# Patient Record
Sex: FEMALE | Race: WHITE | NOT HISPANIC OR LATINO | Employment: FULL TIME | URBAN - METROPOLITAN AREA
[De-identification: names, ages, dates, MRNs, and addresses within clinical notes are randomized per-mention and may not be internally consistent; named-entity substitution may affect disease eponyms.]

---

## 2017-09-09 ENCOUNTER — OFFICE VISIT (OUTPATIENT)
Dept: URGENT CARE | Facility: URGENT CARE | Age: 32
End: 2017-09-09
Payer: COMMERCIAL

## 2017-09-09 VITALS
OXYGEN SATURATION: 98 % | HEART RATE: 84 BPM | WEIGHT: 207 LBS | TEMPERATURE: 98.4 F | DIASTOLIC BLOOD PRESSURE: 84 MMHG | SYSTOLIC BLOOD PRESSURE: 111 MMHG

## 2017-09-09 DIAGNOSIS — M12.9 ARTHROPATHY OF MULTIPLE SITES: Primary | ICD-10-CM

## 2017-09-09 LAB
ANION GAP SERPL CALCULATED.3IONS-SCNC: 8 MMOL/L (ref 3–14)
BASOPHILS # BLD AUTO: 0 10E9/L (ref 0–0.2)
BASOPHILS NFR BLD AUTO: 0.2 %
BUN SERPL-MCNC: 9 MG/DL (ref 7–30)
CALCIUM SERPL-MCNC: 9.2 MG/DL (ref 8.5–10.1)
CHLORIDE SERPL-SCNC: 106 MMOL/L (ref 94–109)
CO2 SERPL-SCNC: 26 MMOL/L (ref 20–32)
CREAT SERPL-MCNC: 0.59 MG/DL (ref 0.52–1.04)
DIFFERENTIAL METHOD BLD: ABNORMAL
EOSINOPHIL # BLD AUTO: 0.1 10E9/L (ref 0–0.7)
EOSINOPHIL NFR BLD AUTO: 1 %
ERYTHROCYTE [DISTWIDTH] IN BLOOD BY AUTOMATED COUNT: 13.4 % (ref 10–15)
GFR SERPL CREATININE-BSD FRML MDRD: >90 ML/MIN/1.7M2
GLUCOSE SERPL-MCNC: 87 MG/DL (ref 70–99)
HCT VFR BLD AUTO: 46.9 % (ref 35–47)
HGB BLD-MCNC: 15.5 G/DL (ref 11.7–15.7)
LYMPHOCYTES # BLD AUTO: 2.3 10E9/L (ref 0.8–5.3)
LYMPHOCYTES NFR BLD AUTO: 22.4 %
MCH RBC QN AUTO: 29.1 PG (ref 26.5–33)
MCHC RBC AUTO-ENTMCNC: 33 G/DL (ref 31.5–36.5)
MCV RBC AUTO: 88 FL (ref 78–100)
MONOCYTES # BLD AUTO: 0.6 10E9/L (ref 0–1.3)
MONOCYTES NFR BLD AUTO: 5.7 %
NEUTROPHILS # BLD AUTO: 7.3 10E9/L (ref 1.6–8.3)
NEUTROPHILS NFR BLD AUTO: 70.7 %
PLATELET # BLD AUTO: 233 10E9/L (ref 150–450)
POTASSIUM SERPL-SCNC: 3.9 MMOL/L (ref 3.4–5.3)
RBC # BLD AUTO: 5.33 10E12/L (ref 3.8–5.2)
SODIUM SERPL-SCNC: 140 MMOL/L (ref 133–144)
WBC # BLD AUTO: 10.3 10E9/L (ref 4–11)

## 2017-09-09 PROCEDURE — 87340 HEPATITIS B SURFACE AG IA: CPT | Performed by: INTERNAL MEDICINE

## 2017-09-09 PROCEDURE — 87389 HIV-1 AG W/HIV-1&-2 AB AG IA: CPT | Performed by: INTERNAL MEDICINE

## 2017-09-09 PROCEDURE — 86803 HEPATITIS C AB TEST: CPT | Performed by: INTERNAL MEDICINE

## 2017-09-09 PROCEDURE — 36415 COLL VENOUS BLD VENIPUNCTURE: CPT | Performed by: INTERNAL MEDICINE

## 2017-09-09 PROCEDURE — 87040 BLOOD CULTURE FOR BACTERIA: CPT | Performed by: INTERNAL MEDICINE

## 2017-09-09 PROCEDURE — 86431 RHEUMATOID FACTOR QUANT: CPT | Performed by: INTERNAL MEDICINE

## 2017-09-09 PROCEDURE — 86618 LYME DISEASE ANTIBODY: CPT | Performed by: INTERNAL MEDICINE

## 2017-09-09 PROCEDURE — 99204 OFFICE O/P NEW MOD 45 MIN: CPT | Performed by: INTERNAL MEDICINE

## 2017-09-09 PROCEDURE — 86140 C-REACTIVE PROTEIN: CPT | Performed by: INTERNAL MEDICINE

## 2017-09-09 PROCEDURE — 80048 BASIC METABOLIC PNL TOTAL CA: CPT | Performed by: INTERNAL MEDICINE

## 2017-09-09 PROCEDURE — 85025 COMPLETE CBC W/AUTO DIFF WBC: CPT | Performed by: INTERNAL MEDICINE

## 2017-09-09 PROCEDURE — 86038 ANTINUCLEAR ANTIBODIES: CPT | Performed by: INTERNAL MEDICINE

## 2017-09-09 RX ORDER — ACETAMINOPHEN 500 MG
1000 TABLET ORAL EVERY 6 HOURS PRN
COMMUNITY
End: 2023-08-24

## 2017-09-09 RX ORDER — DICLOFENAC SODIUM 75 MG/1
75 TABLET, DELAYED RELEASE ORAL 2 TIMES DAILY
Qty: 60 TABLET | Refills: 1 | Status: SHIPPED | OUTPATIENT
Start: 2017-09-09 | End: 2018-02-13

## 2017-09-09 RX ORDER — CETIRIZINE HYDROCHLORIDE 10 MG/1
10 TABLET ORAL DAILY
COMMUNITY
End: 2023-08-24

## 2017-09-09 NOTE — PROGRESS NOTES
"SUBJECTIVE:  Tiffanie Tate, a 32 year old female, presents for evaluation of joint pains.  This started about two weeks ago.  Located in elbows, shoulders, knees, hips.  Associated swelling in the hands and feet.  Pain keeping her awake at night. In the AM, she feels \"really stiff.\"  Will loosen up after about 30 minutes. She had a rash on the gluteal region over the past several weeks. Denies eye irritation or vision changes. No oral ulcers. No pleurisy, chest pain or dyspnea. No sore throat. Appetite is normal.  Bowel pattern basically normal, no abdominal pain. Denies dysuria, hematuria, pyuria, frothy urine. Weight has gone down 5-10 pounds over the past several weeks (she states this is intentional with low carb diet).    FAMH: father gets \"severe joint pain\" but not officially diagnosed.    SOCH: she did go camping in July in [place], no known tick bites; sexually active     PMH: she has seasonal allergies; intermittent asthma; HPV diagnosis in 2007    PSH: PE tubes, sinus surgery in childhood    ROS:  The following systems have been completely reviewed and are negative except as noted in the HPI: CONSTITUTIONAL, EYE, HEAD AND NECK, CARDIOVASCULAR, PULMONARY, GASTROINTESTINAL, RENAL, HEMATOLOGIC, DERMATOLOGIC, MUSCULOSKELETAL and NEUROLOGIC    OBJECTIVE:  /84  Pulse 84  Temp 98.4  F (36.9  C) (Oral)  Wt 207 lb (93.9 kg)  SpO2 98%  GENERAL: healthy, alert and no distress  EYES: PERRLA, EOMI, conjunctivae and sclerae clear, fundi are benign with sharp optic discs  HENT: no oral ulcerations or erythema  NECK: no adenopathy, no asymmetry, masses, or scars and thyroid normal to palpation  RESP: clear to auscultation and percussion bilaterally; normal I:E ratio  CV: regular rates and rhythm, normal S1 S2, no S3 or S4 and no murmur, click or rub -  ABDOMEN: soft, nontender, without hepatosplenomegaly or masses and bowel sounds normal  EXT: no cyanosis, clubbing or edema; peripheral pulses are " brisk and symmetric in the radials, dorsalis pedis and posterior tibials bilaterally  M/SKEL: there are diffuse effusions of the MCP joints on the left hand; no clear joint effusions of other joints but pain with PROM is noted in the hands, elbows, wrists as well as tenderness to palpation of the knees, ankles and toes; no joint erythema  SKIN: erythematous papules are scattered over the buttocks (right > left); some of these have central black necrotic eschar; no purulent discharge  NEURO: Normal strength and tone, sensory exam grossly normal, mentation intact and speech normal     LAB:   Recent Labs   Lab Test  09/09/17   1118   WBC  10.3   RBC  5.33*   HGB  15.5   HCT  46.9   MCV  88   MCH  29.1   MCHC  33.0   RDW  13.4   PLT  233     Recent Labs   Lab Test  09/09/17   1118   NA  140   POTASSIUM  3.9   CHLORIDE  106   CO2  26   BUN  9   CR  0.59   GLC  87     CRP pending  Blood culture pending  HIV, HBV, HCV pending  Lyme Ab pending  Anti Nuclear Ab and RF pending    ASSESSMENT/PLAN:    ICD-10-CM    1. Arthropathy of multiple sites M12.9 CBC with platelets differential     Lyme Disease Yuliet with reflex to WB Serum     Anti Nuclear Yuliet IgG by IFA with Reflex     Rheumatoid factor     Hepatitis B surface antigen     Hepatitis C antibody     HIV Antigen Antibody Combo     Basic metabolic panel  (Ca, Cl, CO2, Creat, Gluc, K, Na, BUN)     UA without Microscopic     NEISSERIA GONORRHOEA PCR     CHLAMYDIA TRACHOMATIS PCR     Blood culture     CRP, inflammation     diclofenac (VOLTAREN) 75 MG EC tablet    Young female, otherwise healthy, presents with acute inflammatory polyarthritis with associated cutaneous lesions that appear to have central eschar; no other organ systems appear to be involved.  She has some constitutional symptoms including fatigue and subjective temperature instability.  Extensive differential diagnosis considered including reactive arthritis, Lyme disease, subacute bacterial endocarditis, rheumatoid  arthritis, arthropathy associated with HBV, HCV or HIV, lupus, sarcoidosis.  While reactive arthritis would be the most likely, the skin lesions do concern me for a possible endovascular infection such as SBE.  Numerous studies are pending aiming at underlying infectious disease or rheumatologic disease.  Start diclofenac for symptom relief and f/u in this coming week to establish primary care.  I have advised establishing at [facility] to review lab results and determine need for further w/u or treatment modification.         Nick Fong MD

## 2017-09-09 NOTE — NURSING NOTE
Chief Complaint   Patient presents with     Joint Pain     joint pain over various parts of body since last month.      Swelling     swelling of right hand and feet off/on for one week.       Initial /84  Pulse 84  Temp 98.4  F (36.9  C) (Oral)  Wt 207 lb (93.9 kg)  SpO2 98% There is no height or weight on file to calculate BMI.  Medication Reconciliation: complete

## 2017-09-09 NOTE — MR AVS SNAPSHOT
After Visit Summary   9/9/2017    Tiffanie Tate    MRN: [id number]           Patient Information     Date Of Birth          1985        Visit Information        Provider Department      9/9/2017 9:45 AM Nick Fong MD [facility]        Today's Diagnoses     Arthropathy of multiple sites    -  1      Care Instructions    At this point, my tentative diagnosis is a reactive arthritis.  We will treat this with diclofenac twice daily as an anti-inflammatory.  You can continue to take acetaminophen (up to 3,000 mg per day -- 1,000 mg three times daily as needed).      A number of tests are pending to look for possible underlying conditions that could have triggered this arthritis.      Make an appointment for follow-up at [facility] in the coming week.  Any of these MDs would be good to follow this up for you:    Dr. Branden Sweet Dr. Serum          Follow-ups after your visit        Your next 10 appointments already scheduled     Oct 04, 2017  2:30 PM CDT   [facility] Ramila Pak,    [facility]    [address] [place] [id number] [phone]           Bring a current list of meds and any records pertaining to this visit. For Physicals, please bring immunization records and any forms needing to be filled out. Please arrive 10 minutes early to complete paperwork.              Who to contact     If you have questions or need follow up information about today's clinic visit or your schedule please contact [facility] directly at [phone].  Normal or non-critical lab and imaging results will be communicated to you by MyChart, letter or phone within 4 business days after the clinic has received the results. If you do not hear from us within 7 days, please contact the clinic through MyChart or phone. If you  "have a critical or abnormal lab result, we will notify you by phone as soon as possible.  Submit refill requests through NuAx or call your pharmacy and they will forward the refill request to us. Please allow 3 business days for your refill to be completed.          Additional Information About Your Visit        AppTankhart Information     NuAx lets you send messages to your doctor, view your test results, renew your prescriptions, schedule appointments and more. To sign up, go to [email or web address] . Click on \"Log in\" on the left side of the screen, which will take you to the Welcome page. Then click on \"Sign up Now\" on the right side of the page.     You will be asked to enter the access code listed below, as well as some personal information. Please follow the directions to create your username and password.     Your access code is: [email or web address]  Expires: 2017 12:36 PM     Your access code will  in 90 days. If you need help or a new code, please call your [place] clinic or [phone].        Care EveryWhere ID     This is your Care EveryWhere ID. This could be used by other organizations to access your [place] medical records  [id number]        Your Vitals Were     Pulse Temperature Pulse Oximetry             84 98.4  F (36.9  C) (Oral) 98%          Blood Pressure from Last 3 Encounters:   17 111/84    Weight from Last 3 Encounters:   17 207 lb (93.9 kg)              We Performed the Following     Anti Nuclear Yuliet IgG by IFA with Reflex     Basic metabolic panel  (Ca, Cl, CO2, Creat, Gluc, K, Na, BUN)     Blood culture     CBC with platelets differential     CHLAMYDIA TRACHOMATIS PCR     CRP, inflammation     Hepatitis B surface antigen     Hepatitis C antibody     HIV Antigen Antibody Combo     Lyme Disease Yuliet with reflex to WB Serum     NEISSERIA GONORRHOEA PCR     Rheumatoid factor     UA without Microscopic          Today's Medication Changes          These changes are " accurate as of: 9/9/17 12:36 PM.  If you have any questions, ask your nurse or doctor.               Start taking these medicines.        Dose/Directions    diclofenac 75 MG EC tablet   Commonly known as:  VOLTAREN   Used for:  Arthropathy of multiple sites   Started by:  Nick Fong MD        Dose:  75 mg   Take 1 tablet (75 mg) by mouth 2 times daily   Quantity:  60 tablet   Refills:  1            Where to get your medicines      These medications were sent to [facility] [id number] [place] [address] - [address] AT [facility] [address]  [address], [facility] [address]    Hours:  24-hours Phone:  [phone]     diclofenac 75 MG EC tablet                Primary Care Provider    None Specified       No primary provider on file.        Equal Access to Services     MARCELL RIVERA AH: Hadparis crawfordo Sobriana, waaxda luqadaha, qaybta kaalmada adeegyada, grant west . So [facility] [phone].    ATENCIÓN: Si habla español, tiene a iyer disposición servicios gratuitos de asistencia lingüística. Llame al [phone].    We comply with applicable federal civil rights laws and [place] laws. We do not discriminate on the basis of race, color, national origin, age, disability sex, sexual orientation or gender identity.            Thank you!     Thank you for choosing [facility]  for your care. Our goal is always to provide you with excellent care. Hearing back from our patients is one way we can continue to improve our services. Please take a few minutes to complete the written survey that you may receive in the mail after your visit with us. Thank you!             Your Updated Medication List - Protect others around you: Learn how to safely use, store and throw away your medicines at www.disposemymeds.org.          This list is accurate as of: 9/9/17 12:36 PM.  Always use your most recent med list.                   Brand Name Dispense  Instructions for use Diagnosis    B-12 PO      Take by mouth daily        cetirizine 10 MG tablet    zyrTEC     Take 10 mg by mouth daily        diclofenac 75 MG EC tablet    VOLTAREN    60 tablet    Take 1 tablet (75 mg) by mouth 2 times daily    Arthropathy of multiple sites       MAGNESIUM CITRATE PO      Take 1 tablet by mouth daily        NEXPLANON 68 MG Impl   Generic drug:  etonogestrel      1 each by Subdermal route once        TYLENOL 500 MG tablet   Generic drug:  acetaminophen      Take 1,000 mg by mouth every 6 hours as needed for mild pain

## 2017-09-09 NOTE — PATIENT INSTRUCTIONS
At this point, my tentative diagnosis is a reactive arthritis.  We will treat this with diclofenac twice daily as an anti-inflammatory.  You can continue to take acetaminophen (up to 3,000 mg per day -- 1,000 mg three times daily as needed).      A number of tests are pending to look for possible underlying conditions that could have triggered this arthritis.      Make an appointment for follow-up at [facility] in the coming week.  Any of these MDs would be good to follow this up for you:    Dr. Branden Sweet Dr. Serum

## 2017-09-10 LAB — CRP SERPL-MCNC: 16 MG/L (ref 0–8)

## 2017-09-11 LAB
B BURGDOR IGG+IGM SER QL: 0.06 (ref 0–0.89)
HBV SURFACE AG SERPL QL IA: NONREACTIVE
HCV AB SERPL QL IA: NONREACTIVE
HIV 1+2 AB+HIV1 P24 AG SERPL QL IA: NONREACTIVE

## 2017-09-12 LAB — RHEUMATOID FACT SER NEPH-ACNC: <20 IU/ML (ref 0–20)

## 2017-09-13 LAB — ANA SER QL IF: NEGATIVE

## 2017-09-15 LAB
BACTERIA SPEC CULT: NO GROWTH
Lab: NORMAL
SPECIMEN SOURCE: NORMAL

## 2017-09-19 ENCOUNTER — OFFICE VISIT (OUTPATIENT)
Dept: PEDIATRICS | Facility: CLINIC | Age: 32
End: 2017-09-19
Payer: COMMERCIAL

## 2017-09-19 VITALS
BODY MASS INDEX: 33.65 KG/M2 | TEMPERATURE: 98.4 F | HEIGHT: 66 IN | WEIGHT: 209.4 LBS | DIASTOLIC BLOOD PRESSURE: 60 MMHG | HEART RATE: 79 BPM | OXYGEN SATURATION: 98 % | SYSTOLIC BLOOD PRESSURE: 100 MMHG

## 2017-09-19 DIAGNOSIS — D22.9 ATYPICAL NEVI: ICD-10-CM

## 2017-09-19 DIAGNOSIS — M25.562 ARTHRALGIA OF BOTH KNEES: Primary | ICD-10-CM

## 2017-09-19 DIAGNOSIS — R21 RASH: ICD-10-CM

## 2017-09-19 DIAGNOSIS — M25.561 ARTHRALGIA OF BOTH KNEES: Primary | ICD-10-CM

## 2017-09-19 LAB
CRP SERPL-MCNC: 15 MG/L (ref 0–8)
ERYTHROCYTE [SEDIMENTATION RATE] IN BLOOD BY WESTERGREN METHOD: 10 MM/H (ref 0–20)

## 2017-09-19 PROCEDURE — 87491 CHLMYD TRACH DNA AMP PROBE: CPT | Performed by: INTERNAL MEDICINE

## 2017-09-19 PROCEDURE — 86747 PARVOVIRUS ANTIBODY: CPT | Mod: 90 | Performed by: INTERNAL MEDICINE

## 2017-09-19 PROCEDURE — 99000 SPECIMEN HANDLING OFFICE-LAB: CPT | Performed by: INTERNAL MEDICINE

## 2017-09-19 PROCEDURE — 85652 RBC SED RATE AUTOMATED: CPT | Performed by: INTERNAL MEDICINE

## 2017-09-19 PROCEDURE — 36415 COLL VENOUS BLD VENIPUNCTURE: CPT | Performed by: INTERNAL MEDICINE

## 2017-09-19 PROCEDURE — 99214 OFFICE O/P EST MOD 30 MIN: CPT | Performed by: INTERNAL MEDICINE

## 2017-09-19 PROCEDURE — 86140 C-REACTIVE PROTEIN: CPT | Performed by: INTERNAL MEDICINE

## 2017-09-19 PROCEDURE — 87591 N.GONORRHOEAE DNA AMP PROB: CPT | Performed by: INTERNAL MEDICINE

## 2017-09-19 RX ORDER — MUPIROCIN 20 MG/G
OINTMENT TOPICAL 3 TIMES DAILY
Qty: 22 G | Refills: 1 | Status: SHIPPED | OUTPATIENT
Start: 2017-09-19 | End: 2017-09-24

## 2017-09-19 NOTE — MR AVS SNAPSHOT
After Visit Summary   9/19/2017    Tiffanie Tate    MRN: [id number]           Patient Information     Date Of Birth          1985        Visit Information        Provider Department      9/19/2017 9:50 AM Ruba Perez MD [facility]        Today's Diagnoses     Arthralgia of both knees    -  1    Rash          Care Instructions    I'm not sure what this is, but so far your labs are relatively reassuring. Your Lyme and rheumatology labs have been normal, just a mildly elevated inflammatory marker.    We will check for infections that can cause these symptoms (parvovirus, gonorrhea). We will also repeat the CRP and check an ESR (inflammatory markers).    This could be a skin infection, so I would like for you to try using mupirocin ointment on the rash areas 2-3 times daily for next 5-7 days.     Continue voltaren for now, take with food!    Follow-up with Rheumatology if your symptoms are not improving.           Follow-ups after your visit        Additional Services     RHEUMATOLOGY REFERRAL       Your provider has referred you to: [facility]:  [facility] [place]   [phone] [email or web address]    Please be aware that coverage of these services is subject to the terms and limitations of your health insurance plan.  Call member services at your health plan with any benefit or coverage questions.      Please bring the following with you to your appointment:    (1) Any X-Rays, CTs or MRIs which have been performed.  Contact the facility where they were done to arrange for  prior to your scheduled appointment.    (2) List of current medications   (3) This referral request   (4) Any documents/labs given to you for this referral                  Your next 10 appointments already scheduled     Oct 04, 2017  2:30 PM CDT   [facility]    [address]  [facility] [address]   [phone]           Bring a current list of meds and any records pertaining to this visit. For Physicals, please bring immunization records and any forms needing to be filled out. Please arrive 10 minutes early to complete paperwork.              Who to contact     If you have questions or need follow up information about today's clinic visit or your schedule please contact [facility] directly at [phone].  Normal or non-critical lab and imaging results will be communicated to you by Greenlinghart, letter or phone within 4 business days after the clinic has received the results. If you do not hear from us within 7 days, please contact the clinic through Greenlinghart or phone. If you have a critical or abnormal lab result, we will notify you by phone as soon as possible.  Submit refill requests through Skipola or call your pharmacy and they will forward the refill request to us. Please allow 3 business days for your refill to be completed.          Additional Information About Your Visit        Skipola Information     Skipola lets you send messages to your doctor, view your test results, renew your prescriptions, schedule appointments and more. To sign up, go to [email or web address] . Click on \"Log in\" on the left side of the screen, which will take you to the Welcome page. Then click on \"Sign up Now\" on the right side of the page.     You will be asked to enter the access code listed below, as well as some personal information. Please follow the directions to create your username and password.     Your access code is: [email or web address]  Expires: 2017 12:36 PM     Your access code will  in 90 days. If you need help or a new code, please call your [place] clinic or [phone].        Care EveryWhere ID     This is your Care EveryWhere ID. This could be used by other organizations to access your [place] medical records  [id number]        Your Vitals Were     " "Pulse Temperature Height Pulse Oximetry BMI (Body Mass Index)       79 98.4  F (36.9  C) (Oral) 5' 5.55\" (1.665 m) 98% 34.26 kg/m2        Blood Pressure from Last 3 Encounters:   09/19/17 100/60   09/09/17 111/84    Weight from Last 3 Encounters:   09/19/17 209 lb 6.4 oz (95 kg)   09/09/17 207 lb (93.9 kg)              We Performed the Following     Chlamydia trachomatis PCR     CRP, inflammation     Erythrocyte sedimentation rate auto     Neisseria gonorrhoeae PCR     Parvovirus B19 antibodies IgG IgM     RHEUMATOLOGY REFERRAL          Today's Medication Changes          These changes are accurate as of: 9/19/17 10:16 AM.  If you have any questions, ask your nurse or doctor.               Start taking these medicines.        Dose/Directions    mupirocin 2 % ointment   Commonly known as:  BACTROBAN   Used for:  Rash   Started by:  Ruba Perez MD        Apply topically 3 times daily for 5 days   Quantity:  22 g   Refills:  1            Where to get your medicines      These medications were sent to [facility] [address] [place] [address] [address] AT [facility] [address]  [address], [facility] [address]    Hours:  24-hours Phone:  [phone]     mupirocin 2 % ointment                Primary Care Provider    None Specified       No primary provider on file.        Equal Access to Services     MARCELL RIVERA AH: Hadii alf ku hadasho Soomaali, waaxda luqadaha, qaybta kaalmada adeegyada, grant torres. So [facility] [place] [facility] [phone].    ATENCIÓN: Si habla español, tiene a iyer disposición servicios gratuitos de asistencia lingüística. Javier al [phone].    We comply with applicable federal civil rights laws and [place] laws. We do not discriminate on the basis of race, color, national origin, age, disability sex, sexual orientation or gender identity.            Thank you!     Thank you for choosing [facility]  for your care. Our goal is always to provide " you with excellent care. Hearing back from our patients is one way we can continue to improve our services. Please take a few minutes to complete the written survey that you may receive in the mail after your visit with us. Thank you!             Your Updated Medication List - Protect others around you: Learn how to safely use, store and throw away your medicines at www.disposemymeds.org.          This list is accurate as of: 9/19/17 10:16 AM.  Always use your most recent med list.                   Brand Name Dispense Instructions for use Diagnosis    B-12 PO      Take by mouth daily        cetirizine 10 MG tablet    zyrTEC     Take 10 mg by mouth daily        diclofenac 75 MG EC tablet    VOLTAREN    60 tablet    Take 1 tablet (75 mg) by mouth 2 times daily    Arthropathy of multiple sites       MAGNESIUM CITRATE PO      Take 1 tablet by mouth daily        mupirocin 2 % ointment    BACTROBAN    22 g    Apply topically 3 times daily for 5 days    Rash       NEXPLANON 68 MG Impl   Generic drug:  etonogestrel      1 each by Subdermal route once        TYLENOL 500 MG tablet   Generic drug:  acetaminophen      Take 1,000 mg by mouth every 6 hours as needed for mild pain

## 2017-09-19 NOTE — PATIENT INSTRUCTIONS
I'm not sure what this is, but so far your labs are relatively reassuring. Your Lyme and rheumatology labs have been normal, just a mildly elevated inflammatory marker.    We will check for infections that can cause these symptoms (parvovirus, gonorrhea). We will also repeat the CRP and check an ESR (inflammatory markers).    This could be a skin infection, so I would like for you to try using mupirocin ointment on the rash areas 2-3 times daily for next 5-7 days.     Continue voltaren for now, take with food!    Follow-up with Rheumatology if your symptoms are not improving.

## 2017-09-19 NOTE — PROGRESS NOTES
"  SUBJECTIVE:   Tiffanie Tate is a 32 year old female who presents to clinic today for the following health issues:      ED/UC Followup:    Facility:  [facility] in [place]  Date of visit: 9/9/17  Reason for visit: joint pain and swelling  Current Status: some pain in both knees.       Tiffanie comes in for follow-up of her rash and joint pain. She reports that the rash has improved although she is still getting some new spots on her buttocks. Rash in her groin is improving. Does still have some bilateral knee pain and swelling. R hand swelling is much better, but L hand feels more swollen. No fevers or chills currently, but did have fevers prior to this presentation. Does report some elbow pain as well. No weakness, cough, blood in urine or sputum. No abdominal pain. No vaginal discharge. She is sexually active.    Problem list and histories reviewed & adjusted, as indicated.  Additional history: as documented    There is no problem list on file for this patient.    History reviewed. No pertinent surgical history.    Social History   Substance Use Topics     Smoking status: Never Smoker     Smokeless tobacco: Never Used     Alcohol use Not on file     History reviewed. No pertinent family history.          Reviewed and updated as needed this visit by clinical staff  Tobacco  Allergies  Med Hx  Surg Hx  Fam Hx  Soc Hx        ROS:  Constitutional, derm, rheum, pulmonary, gi and gu systems are negative, except as otherwise noted.      OBJECTIVE:   /60 (BP Location: Right arm, Patient Position: Chair, Cuff Size: Adult Large)  Pulse 79  Temp 98.4  F (36.9  C) (Oral)  Ht 5' 5.55\" (1.665 m)  Wt 209 lb 6.4 oz (95 kg)  SpO2 98%  BMI 34.26 kg/m2  Body mass index is 34.26 kg/(m^2).  GENERAL: healthy, alert and no distress  EYES: Eyes grossly normal to inspection, PERRL and conjunctivae and sclerae normal  HENT: nose and mouth without ulcers or lesions, oropharynx clear and oral mucous membranes " moist  NECK: no adenopathy, no asymmetry, masses, or scars and thyroid normal to palpation  RESP: lungs clear to auscultation - no rales, rhonchi or wheezes  CV: regular rate and rhythm, normal S1 S2, no S3 or S4, no murmur, click or rub, no peripheral edema and peripheral pulses strong  ABDOMEN: soft, nontender, no hepatosplenomegaly, no masses and bowel sounds normal  MS: perhaps minimal joint effusions in knees bilaterally, otherwise no other joint effusions appreciated.   SKIN: scattered erythematous papules over buttocks bilaterally, in various stages of healing. L inguinal crease with what appears to be healing denuded bulla.     Diagnostic Test Results:  none     ASSESSMENT/PLAN:     1. Arthralgia of both knees  In setting of rash, knee and wrist pain, fevers, and elevated CRP recently, certainly broad differential. Recent blood culture, lyme, and hepatitis testing negative. Would also consider inflammatory process, acute viral illness such as Parvovirus, possible serum sickness (however, unclear trigger), gonorrheal infection, or other inflammatory arthritis (less likely with negative NIKI). Bacterial infection possible, although blood culture negative. Will treat denuded rash area with mupirocin for now. Will obtain labs as below. Patient's symptoms appear to be gradually improving, if labs concerning or worsening, or if symptoms worsen will follow-up with Rheumatology for further work-up.   - RHEUMATOLOGY REFERRAL  - CRP, inflammation  - Erythrocyte sedimentation rate auto  - Parvovirus B19 antibodies IgG IgM  - Neisseria gonorrhoeae PCR  - Chlamydia trachomatis PCR    2. Rash  As above.   - mupirocin (BACTROBAN) 2 % ointment; Apply topically 3 times daily for 5 days  Dispense: 22 g; Refill: 1  - CRP, inflammation  - Erythrocyte sedimentation rate auto  - Parvovirus B19 antibodies IgG IgM  - Neisseria gonorrhoeae PCR  - Chlamydia trachomatis PCR    3. Atypical nevi  Patient requesting referral to Derm for  FBSE.   - DERMATOLOGY REFERRAL    Patient Instructions   I'm not sure what this is, but so far your labs are relatively reassuring. Your Lyme and rheumatology labs have been normal, just a mildly elevated inflammatory marker.    We will check for infections that can cause these symptoms (parvovirus, gonorrhea). We will also repeat the CRP and check an ESR (inflammatory markers).    This could be a skin infection, so I would like for you to try using mupirocin ointment on the rash areas 2-3 times daily for next 5-7 days.     Continue voltaren for now, take with food!    Follow-up with Rheumatology if your symptoms are not improving.       Ruba Nagel MD  [facility]

## 2017-09-19 NOTE — NURSING NOTE
"Chief Complaint   Patient presents with     ER F/U       Initial /60 (BP Location: Right arm, Patient Position: Chair, Cuff Size: Adult Large)  Pulse 79  Temp 98.4  F (36.9  C) (Oral)  Ht 5' 5.55\" (1.665 m)  Wt 209 lb 6.4 oz (95 kg)  SpO2 98%  BMI 34.26 kg/m2 Estimated body mass index is 34.26 kg/(m^2) as calculated from the following:    Height as of this encounter: 5' 5.55\" (1.665 m).    Weight as of this encounter: 209 lb 6.4 oz (95 kg).  Medication Reconciliation: complete   Susan Alvarez MA    "

## 2017-09-20 LAB
B19V IGG SER IA-ACNC: 5.26 IV
B19V IGM SER IA-ACNC: 0.11 IV
C TRACH DNA SPEC QL NAA+PROBE: NEGATIVE
N GONORRHOEA DNA SPEC QL NAA+PROBE: NEGATIVE
SPECIMEN SOURCE: NORMAL
SPECIMEN SOURCE: NORMAL

## 2017-10-04 ENCOUNTER — OFFICE VISIT (OUTPATIENT)
Dept: OBGYN | Facility: CLINIC | Age: 32
End: 2017-10-04
Payer: COMMERCIAL

## 2017-10-04 VITALS
SYSTOLIC BLOOD PRESSURE: 100 MMHG | BODY MASS INDEX: 33.37 KG/M2 | TEMPERATURE: 99.1 F | HEIGHT: 66 IN | DIASTOLIC BLOOD PRESSURE: 64 MMHG | WEIGHT: 207.6 LBS

## 2017-10-04 DIAGNOSIS — R10.2 PELVIC PAIN IN FEMALE: ICD-10-CM

## 2017-10-04 DIAGNOSIS — N93.9 ABNORMAL UTERINE BLEEDING (AUB): ICD-10-CM

## 2017-10-04 DIAGNOSIS — Z00.00 ROUTINE GENERAL MEDICAL EXAMINATION AT A HEALTH CARE FACILITY: Primary | ICD-10-CM

## 2017-10-04 PROCEDURE — G0145 SCR C/V CYTO,THINLAYER,RESCR: HCPCS | Performed by: FAMILY MEDICINE

## 2017-10-04 PROCEDURE — 11982 REMOVE DRUG IMPLANT DEVICE: CPT | Performed by: FAMILY MEDICINE

## 2017-10-04 PROCEDURE — 99385 PREV VISIT NEW AGE 18-39: CPT | Mod: 25 | Performed by: FAMILY MEDICINE

## 2017-10-04 PROCEDURE — 99213 OFFICE O/P EST LOW 20 MIN: CPT | Mod: 25 | Performed by: FAMILY MEDICINE

## 2017-10-04 PROCEDURE — 87624 HPV HI-RISK TYP POOLED RSLT: CPT | Performed by: FAMILY MEDICINE

## 2017-10-04 RX ORDER — CYCLOBENZAPRINE HCL 5 MG
5 TABLET ORAL 3 TIMES DAILY PRN
Qty: 42 TABLET | Refills: 3 | Status: SHIPPED | OUTPATIENT
Start: 2017-10-04 | End: 2019-06-28

## 2017-10-04 NOTE — NURSING NOTE
"Chief Complaint   Patient presents with     Gyn Exam     discuss removing Nexplanon--it was placed 1/2016--using birth control to control bleeding--discuss severe menstraul pain       Initial /64  Temp 99.1  F (37.3  C) (Oral)  Ht 5' 5.5\" (1.664 m)  Wt 207 lb 9.6 oz (94.2 kg)  BMI 34.02 kg/m2 Estimated body mass index is 34.02 kg/(m^2) as calculated from the following:    Height as of this encounter: 5' 5.5\" (1.664 m).    Weight as of this encounter: 207 lb 9.6 oz (94.2 kg).  Medication Reconciliation: darrel Jang CMA      "

## 2017-10-04 NOTE — MR AVS SNAPSHOT
After Visit Summary   10/4/2017    Tiffanie Tate    MRN: [id number]           Patient Information     Date Of Birth          1985        Visit Information        Provider Department      10/4/2017 2:30 PM Ramila Pak, DO [facility]        Today's Diagnoses     Routine general medical examination at a health care facility    -  1    Abnormal uterine bleeding (AUB)        Pelvic pain in female          Care Instructions    Call Lab [phone] to schedule future labs. You may schedule   The [facility].  If you are getting cholesterol checked please fast 8 hours     You can up the ultrasound     Dr. Ramila Pak, DO    Obstetrics and Gynecology  [facility] [place]                   Follow-ups after your visit        Future tests that were ordered for you today     Open Future Orders        Priority Expected Expires Ordered    Anti Treponema Routine  10/4/2018 10/4/2017    HIV Antigen Antibody Combo Routine  10/4/2018 10/4/2017    US Pelvic Complete with Transvaginal Routine 10/4/2017 4/2/2018 10/4/2017    **CBC with platelets FUTURE anytime Routine 10/4/2017 10/4/2018 10/4/2017    **TSH with free T4 reflex FUTURE anytime Routine 10/4/2017 10/4/2018 10/4/2017    **Comprehensive metabolic panel FUTURE anytime Routine 10/4/2017 10/4/2018 10/4/2017    **Lipid panel reflex to direct LDL FUTURE anytime Routine 10/4/2017 10/4/2018 10/4/2017            Who to contact     If you have questions or need follow up information about today's clinic visit or your schedule please contact [facility] directly at [phone].  Normal or non-critical lab and imaging results will be communicated to you by MyChart, letter or phone within 4 business days after the clinic has received the results. If you do not hear from us within 7 days, please contact the clinic through MyChart or phone. If you have a critical or abnormal  "lab result, we will notify you by phone as soon as possible.  Submit refill requests through [employer] or call your pharmacy and they will forward the refill request to us. Please allow 3 business days for your refill to be completed.          Additional Information About Your Visit        MyChart Information     [employer] lets you send messages to your doctor, view your test results, renew your prescriptions, schedule appointments and more. To sign up, go to [email or web address] . Click on \"Log in\" on the left side of the screen, which will take you to the Welcome page. Then click on \"Sign up Now\" on the right side of the page.     You will be asked to enter the access code listed below, as well as some personal information. Please follow the directions to create your username and password.     Your access code is: [email or web address]  Expires: 2017 12:36 PM     Your access code will  in 90 days. If you need help or a new code, please call your [facility] clinic or [phone].        Care EveryWhere ID     This is your Care EveryWhere ID. This could be used by other organizations to access your [facility] medical records  [id number]        Your Vitals Were     Temperature Height BMI (Body Mass Index)             99.1  F (37.3  C) (Oral) 5' 5.5\" (1.664 m) 34.02 kg/m2          Blood Pressure from Last 3 Encounters:   10/04/17 100/64   17 100/60   17 111/84    Weight from Last 3 Encounters:   10/04/17 207 lb 9.6 oz (94.2 kg)   17 209 lb 6.4 oz (95 kg)   17 207 lb (93.9 kg)              We Performed the Following     REMOVAL NON-BIODEGRADABLE DRUG DELIVERY IMPLANT        Primary Care Provider    None Specified       No primary provider on file.        Equal Access to Services     [facility] NICOLE : Nafisa Wagner, marsha alejandre, tyrone bhatti, grant torres. [facility] [phone].    ATENCIÓN: Si habla español, tiene a iyer disposición " servicios gratuitos de asistencia lingüística. Javier hernandez [phone].    We comply with applicable federal civil rights laws and [place] laws. We do not discriminate on the basis of race, color, national origin, age, disability, sex, sexual orientation, or gender identity.            Thank you!     Thank you for choosing [facility]  for your care. Our goal is always to provide you with excellent care. Hearing back from our patients is one way we can continue to improve our services. Please take a few minutes to complete the written survey that you may receive in the mail after your visit with us. Thank you!             Your Updated Medication List - Protect others around you: Learn how to safely use, store and throw away your medicines at www.disposemymeds.org.          This list is accurate as of: 10/4/17  2:46 PM.  Always use your most recent med list.                   Brand Name Dispense Instructions for use Diagnosis    B-12 PO      Take by mouth daily        cetirizine 10 MG tablet    zyrTEC     Take 10 mg by mouth daily        diclofenac 75 MG EC tablet    VOLTAREN    60 tablet    Take 1 tablet (75 mg) by mouth 2 times daily    Arthropathy of multiple sites       MAGNESIUM CITRATE PO      Take 1 tablet by mouth daily        NEXPLANON 68 MG Impl   Generic drug:  etonogestrel      1 each by Subdermal route once        TYLENOL 500 MG tablet   Generic drug:  acetaminophen      Take 1,000 mg by mouth every 6 hours as needed for mild pain

## 2017-10-04 NOTE — PROGRESS NOTES
SUBJECTIVE:  Tiffanie Tate is an 32 year old woman who presents for   annual gyn exam. No LMP recorded. Periods are regular q 3 weeks, lasting   4-5 days. Dysmenorrhea:severe, occurring throughout menses. Cyclic symptoms   include pelvic pain/cramping, headache and hot flashes. No intermenstrual bleeding,   spotting, or discharge.  Menarche age 13. STD hx: none.     Current contraception: Nexplanon   MARIA DEL ROSARIO exposure: unknown  History of abnormal Pap smear: Yes: colposcopy - everything normal after  Family history of uterine or ovarian cancer: No  Regular self breast exam: Yes  History of abnormal mammogram: No  Family history of breast cancer: Yes: maternal Aunt  History of abnormal lipids: No    Pt would like arm contraceptive implant removed. She has experienced severe menstrual symptoms, such as hot flashes and cramping, since she was around 16. She would like to better understand why this has occurred, and would like to look into resolving this issue. She has previously taken Aleve to relieve her symptoms, but began taking too much for her comfort. She has not taken anything recently, but has applied heating pads with some success.    History reviewed. No pertinent past medical history.     History reviewed. No pertinent family history.    History reviewed. No pertinent surgical history.    Current Outpatient Prescriptions   Medication     cyclobenzaprine (FLEXERIL) 5 MG tablet     cetirizine (ZYRTEC) 10 MG tablet     diclofenac (VOLTAREN) 75 MG EC tablet     etonogestrel (NEXPLANON) 68 MG IMPL     Cyanocobalamin (B-12 PO)     MAGNESIUM CITRATE PO     acetaminophen (TYLENOL) 500 MG tablet     No current facility-administered medications for this visit.      Allergies   Allergen Reactions     Sulfa Drugs        Social History   Substance Use Topics     Smoking status: Never Smoker     Smokeless tobacco: Never Used     Alcohol use Not on file       Review Of Systems  Ears/Nose/Throat: negative  Respiratory:  "No shortness of breath, dyspnea on exertion, cough, or hemoptysis  Cardiovascular: negative  Gastrointestinal: negative  Genitourinary: positive for abnormal menstrual cycles  Constitutional, HEENT, cardiovascular, pulmonary, GI, , musculoskeletal, neuro, skin, endocrine and psych systems are negative, except as otherwise noted.    This document serves as a record of the services and decisions personally performed and made by Ramila Pak DO. It was created on her behalf by Ximena Giron, a trained medical scribe. The creation of this document is based the provider's statements to the medical scribe.  Scribe Ximena Giron 2:39 PM, October 4, 2017    OBJECTIVE:  /64  Temp 99.1  F (37.3  C) (Oral)  Ht 1.664 m (5' 5.5\")  Wt 94.2 kg (207 lb 9.6 oz)  BMI 34.02 kg/m2    General appearance: healthy, alert and no distress  Skin: Skin color, texture, turgor normal. No rashes or lesions.  Ears: negative  Nose/Sinuses: Nares normal. Septum midline. Mucosa normal. No drainage or sinus tenderness.  Oropharynx: Lips, mucosa, and tongue normal. Teeth and gums normal.  Neck: Neck supple. No adenopathy. Thyroid symmetric, normal size,, Carotids without bruits.  Lungs: negative, Percussion normal. Good diaphragmatic excursion. Lungs clear  Heart: negative, PMI normal. No lifts, heaves, or thrills. RRR. No murmurs, clicks gallops or rub  Breasts: Inspection negative. No nipple discharge or bleeding. No masses.  Abdomen: Abdomen soft, non-tender. BS normal. No masses, organomegaly  Pelvic: Pelvic:  Pelvic examination with pap/ Gonorrhea and Chlamydia   including  External genitalia normal   and vagina normal rugatted non atrophic  Examination of urethra  normal no masses, tenderness, scarring  bladder, no masses or tenderness  Cervix no lesions, slight discharge, slight tenderness  Bimanual exam with   Uterus 6 weeks size, mid position, mobile, slight-tenderness, no descent   Adnexa/parametria normal    Procedure: The " Implanon/Nexplanon aileen was located in the left arm and the area cleansed with betadine.  1% lidocaine with epinephrine was injected into the area and a small skin incision made using a scalpel.  The Implanon/Nexplanon was gently manipulated until the tip was at the incision.  A Ann clamp was used to remove the Nexplanon completely intact.  The incision site was hemostatic and a steri-strip applied to the area.    ASSESSMENT:  Tiffanie Tate is an 32 year old woman who presents for   annual gyn exam. Concerns:  Menorrhagia and dysmenorrhea     PLAN:  Dx: Menorrhagia, Dysmenorrhea; Nexplanon removal  1)  Pap smear, STD testing  2)  Mammography, lipids at appropriate intervals  3) Nexplanon removal performed.  4) Pt desires US to determine endometrial diagnosis, would be open to surgery in future if results are positive.  5) Pt informed of non-surgical options to treat endometriosis, such as Lupron or Femara treatments, to induce medical menopause, as well as surgical options such as ablation.  6) Pt advised to begin Ibuprofen use, along with Tylenol, to relieve menstrual pain. She was also prescribed a muscle relaxer, Flexeril, to aid with relief as well.  7) Return for US and Labs.    Rx:  Flexeril     PE:  Reviewed health maintenance including diet, regular exercise   and periodic exams.    The information in this document, created by the medical scribe for me, accurately reflects the services I personally performed and the decisions made by me. I have reviewed and approved this document for accuracy prior to leaving the patient care area.  2:39 PM, 10/04/17    Dr. Ramila Pak, DO    Obstetrics and Gynecology  [facility]

## 2017-10-04 NOTE — PATIENT INSTRUCTIONS
Call Lab [phone] to schedule future labs. You may schedule   The labs at any [facility] facitily.  If you are getting cholesterol checked please fast 8 hours     You can up the ultrasound     Dr. Ramila Pak, DO    Obstetrics and Gynecology  [facility] [place]

## 2017-10-04 NOTE — NURSING NOTE
1% lidocaine with epinephrine used for block  Lot: -[id number]  Exp: 4/1/2018  ND: [id number]  Ashley Jang CMA

## 2017-10-06 LAB
COPATH REPORT: NORMAL
PAP: NORMAL

## 2017-10-11 ENCOUNTER — RESULT FOLLOW UP (OUTPATIENT)
Dept: OBGYN | Facility: CLINIC | Age: 32
End: 2017-10-11

## 2017-10-11 DIAGNOSIS — R87.810 CERVICAL HIGH RISK HPV (HUMAN PAPILLOMAVIRUS) TEST POSITIVE: ICD-10-CM

## 2017-10-11 DIAGNOSIS — N87.1 CIN II (CERVICAL INTRAEPITHELIAL NEOPLASIA II): ICD-10-CM

## 2017-10-11 LAB
FINAL DIAGNOSIS: ABNORMAL
HPV HR 12 DNA CVX QL NAA+PROBE: NEGATIVE
HPV16 DNA SPEC QL NAA+PROBE: POSITIVE
HPV18 DNA SPEC QL NAA+PROBE: NEGATIVE
SPECIMEN DESCRIPTION: ABNORMAL

## 2017-10-11 NOTE — PROGRESS NOTES
10/4/17 NIL, +HPV 16. Plan colp  10/11/17 Left message for patient to call back. Patient notified of results/recommendations.   2/15/18 [place] bx: DESMOND 1, ECC: DESMOND 1 & DESMOND 2. Plan LEEP ([facility])  2/21/18 LEEP bx: DESMOND 2. Plan 6 month pap due 8/21/18 - pt notified of results at post-op visit on 2/27/18 ([facility])  9/6/18 Dx ASCUS pap, neg HR HPV. Plan 6 month cotest due 3/6/19 ([facility]) Patient read my chart on 10/12/18  2/21/19 My Chart cotest reminder message sent (rlm)  3/5/19 My Chart not read, reminder letter sent (rl)  3/20/19 Call - left msg ([facility])  4/4/19 This pt is lost to follow-up for pap tracking. Result follow-up encounter has been sent to provider as an FYI.

## 2017-10-11 NOTE — LETTER
February 21, 2019      Tiffanie Josephine Bebeto  [address]  [address]  [place] [address]    Dear MsEliasbryan,      At [facility], your health and wellness is our primary concern. That is why we are following up on an abnormal pap from 9/6/18, which was reported as ASCUS. Your provider had recommended that you have a Pap smear and HPV test completed by 3/6/19. Our records do not show that this has been scheduled.    It is important to complete the follow up that your provider has suggested for you to ensure that there are no worsening changes which may, over time, develop into cancer.      Please contact our office at  [phone] to schedule an appointment for a Pap smear and HPV test at your earliest convenience. If you have questions or concerns, please call the clinic and we will be happy to assist you.    If you have completed the tests outside of [facility], please have the results forwarded to our office. We will update the chart for your primary Physician to review before your next annual physical.     Thank you for choosing [facility]!    Sincerely,      Ramila Pak DO/bettina

## 2017-10-11 NOTE — LETTER
March 5, 2019      Tiffanie Josephine Bebeto  [address]  [address]  [place] [address]    Dear Ms.Mynorjeffnheliazar,      At [facility], your health and wellness is our primary concern. That is why we are following up on an abnormal pap from 9/6/18, which was reported as ASCUS. Your provider had recommended that you have a Pap smear and HPV test completed by 3/6/19. Our records do not show that this has been scheduled.    It is important to complete the follow up that your provider has suggested for you to ensure that there are no worsening changes which may, over time, develop into cancer.      Please contact our office at  [phone] to schedule an appointment for a Pap smear and HPV test at your earliest convenience. If you have questions or concerns, please call the clinic and we will be happy to assist you.    If you have completed the tests outside of [facility], please have the results forwarded to our office. We will update the chart for your primary Physician to review before your next annual physical.     Thank you for choosing [facility]!    Sincerely,      Ramila Pak DO/bettina

## 2017-10-17 ENCOUNTER — RADIANT APPOINTMENT (OUTPATIENT)
Dept: ULTRASOUND IMAGING | Facility: CLINIC | Age: 32
End: 2017-10-17
Attending: FAMILY MEDICINE
Payer: COMMERCIAL

## 2017-10-17 DIAGNOSIS — R10.2 PELVIC PAIN IN FEMALE: ICD-10-CM

## 2017-10-17 DIAGNOSIS — N83.209 CYST OF OVARY, UNSPECIFIED LATERALITY: Primary | ICD-10-CM

## 2017-10-17 DIAGNOSIS — N93.9 ABNORMAL UTERINE BLEEDING (AUB): ICD-10-CM

## 2017-10-17 PROCEDURE — 76856 US EXAM PELVIC COMPLETE: CPT | Performed by: OBSTETRICS & GYNECOLOGY

## 2017-10-17 PROCEDURE — 76830 TRANSVAGINAL US NON-OB: CPT | Performed by: OBSTETRICS & GYNECOLOGY

## 2017-10-24 DIAGNOSIS — Z00.00 ROUTINE GENERAL MEDICAL EXAMINATION AT A HEALTH CARE FACILITY: ICD-10-CM

## 2017-10-24 LAB
ALBUMIN SERPL-MCNC: 3.6 G/DL (ref 3.4–5)
ALP SERPL-CCNC: 50 U/L (ref 40–150)
ALT SERPL W P-5'-P-CCNC: 24 U/L (ref 0–50)
ANION GAP SERPL CALCULATED.3IONS-SCNC: 9 MMOL/L (ref 3–14)
AST SERPL W P-5'-P-CCNC: 12 U/L (ref 0–45)
BILIRUB SERPL-MCNC: 0.4 MG/DL (ref 0.2–1.3)
BUN SERPL-MCNC: 12 MG/DL (ref 7–30)
CALCIUM SERPL-MCNC: 8.2 MG/DL (ref 8.5–10.1)
CHLORIDE SERPL-SCNC: 111 MMOL/L (ref 94–109)
CHOLEST SERPL-MCNC: 129 MG/DL
CO2 SERPL-SCNC: 22 MMOL/L (ref 20–32)
CREAT SERPL-MCNC: 0.7 MG/DL (ref 0.52–1.04)
ERYTHROCYTE [DISTWIDTH] IN BLOOD BY AUTOMATED COUNT: 13.3 % (ref 10–15)
GFR SERPL CREATININE-BSD FRML MDRD: >90 ML/MIN/1.7M2
GLUCOSE SERPL-MCNC: 88 MG/DL (ref 70–99)
HCT VFR BLD AUTO: 40.5 % (ref 35–47)
HDLC SERPL-MCNC: 40 MG/DL
HGB BLD-MCNC: 13.1 G/DL (ref 11.7–15.7)
LDLC SERPL CALC-MCNC: 78 MG/DL
MCH RBC QN AUTO: 29.2 PG (ref 26.5–33)
MCHC RBC AUTO-ENTMCNC: 32.3 G/DL (ref 31.5–36.5)
MCV RBC AUTO: 90 FL (ref 78–100)
NONHDLC SERPL-MCNC: 89 MG/DL
PLATELET # BLD AUTO: 277 10E9/L (ref 150–450)
POTASSIUM SERPL-SCNC: 3.7 MMOL/L (ref 3.4–5.3)
PROT SERPL-MCNC: 7.1 G/DL (ref 6.8–8.8)
RBC # BLD AUTO: 4.48 10E12/L (ref 3.8–5.2)
SODIUM SERPL-SCNC: 142 MMOL/L (ref 133–144)
TRIGL SERPL-MCNC: 53 MG/DL
TSH SERPL DL<=0.005 MIU/L-ACNC: 3.2 MU/L (ref 0.4–4)
WBC # BLD AUTO: 7.9 10E9/L (ref 4–11)

## 2017-10-24 PROCEDURE — 80053 COMPREHEN METABOLIC PANEL: CPT | Performed by: FAMILY MEDICINE

## 2017-10-24 PROCEDURE — 36415 COLL VENOUS BLD VENIPUNCTURE: CPT | Performed by: FAMILY MEDICINE

## 2017-10-24 PROCEDURE — 80061 LIPID PANEL: CPT | Performed by: FAMILY MEDICINE

## 2017-10-24 PROCEDURE — 87389 HIV-1 AG W/HIV-1&-2 AB AG IA: CPT | Performed by: FAMILY MEDICINE

## 2017-10-24 PROCEDURE — 85027 COMPLETE CBC AUTOMATED: CPT | Performed by: FAMILY MEDICINE

## 2017-10-24 PROCEDURE — 86780 TREPONEMA PALLIDUM: CPT | Performed by: FAMILY MEDICINE

## 2017-10-24 PROCEDURE — 84443 ASSAY THYROID STIM HORMONE: CPT | Performed by: FAMILY MEDICINE

## 2017-10-25 ENCOUNTER — MYC MEDICAL ADVICE (OUTPATIENT)
Dept: OBGYN | Facility: CLINIC | Age: 32
End: 2017-10-25

## 2017-10-25 LAB
HIV 1+2 AB+HIV1 P24 AG SERPL QL IA: NONREACTIVE
T PALLIDUM IGG+IGM SER QL: NEGATIVE

## 2017-10-26 NOTE — TELEPHONE ENCOUNTER
Patient would like to schedule surgery in Dec. (exp lap and leep)  Please advise.  Barbra Grajeda RN

## 2017-10-30 DIAGNOSIS — R10.2 PELVIC PAIN IN FEMALE: Primary | ICD-10-CM

## 2017-10-30 DIAGNOSIS — N87.9 CERVICAL DYSPLASIA: ICD-10-CM

## 2017-10-30 NOTE — PROGRESS NOTES
Surgeon:TY MACEDO  Assist:  Yes  Location: [facility]  Date/time preference:  Late december    Surgery:  Robotic assisted laparoscopic endometriosis resection, LEEP procedure   Length of Surgery:  1 hour  Diagnosis:  Pelvic pain, Cervical dysplasia  Anesthesia type:  GENERAL    Special instructions / equipment:    Am admit or same day: SAME DAY  Bowel prep: Yes  Pre op: PCP  Office visit with surgeon prior to surgery: No

## 2017-10-31 NOTE — TELEPHONE ENCOUNTER
Surgeon:TY MACEDO  Assist:  Yes  Location: [facility]  Date/time preference:  Late december     Surgery:  Robotic assisted laparoscopic endometriosis resection, LEEP procedure   Length of Surgery:  1 hour  Diagnosis:  Pelvic pain, Cervical dysplasia  Anesthesia type:  GENERAL     Special instructions / equipment:    Am admit or same day: SAME DAY  Bowel prep: Yes  Pre op: PCP  Office visit with surgeon prior to surgery: No

## 2017-10-31 NOTE — TELEPHONE ENCOUNTER
Surgery:  ROBOTIC ASSISTED LAPAROSCOPIC ENDOMETRIOSIS RESECTION, LEEP PROCEDURE  Date:  12/26/17  Time:  8:30 AM  Hospital:  [facility]    Patient advised of the following:  The hospital will contact you 24-48 hours prior to surgery to discuss any pre op instructions.  Contact your insurance company to see if a prior authorization or second opinion is needed.  Please schedule a pre op appointment with your primary physician within 7 days of surgery.  No aspirin or Ibuprofen 10 days prior to surgery.  Make arrangements to have someone drive you home from the hospital.  Follow bowel prep instructions the day before surgery.    Surgery specific and hospital information mailed to patient.    Information was placed on the surgery calendar in [place].

## 2018-01-09 NOTE — TELEPHONE ENCOUNTER
12/15/17  Surgery rescheduled to 1/31/18 with Helena at [facility].  Notified patient of rescheduled date/time of surgery and mailed updated information to the patient.  Updated outlook calendar.  Notified Marline of rescheduled date/time of surgery.

## 2018-01-09 NOTE — TELEPHONE ENCOUNTER
Spoke with patient and she would like to reschedule surgery to the end of February.  She will call back to reschedule surgery after discussing possible time off from work with her employer.    Canceled surgery with Jarek at [facility].  Removed surgery from outlook calendar.  Left message for Marline regarding cancellation.

## 2018-01-09 NOTE — TELEPHONE ENCOUNTER
Surgery:  ROBOTIC ASSISTED LAPAROSCOPIC ENDOMETRIOSIS RESECTION, LEEP PROCEDURE  Date:  2/21/18  Time:  7:45 AM  Hospital:  [facility]    Patient advised of the following:  The hospital will contact you 24-48 hours prior to surgery to discuss any pre op instructions.  Contact your insurance company to see if a prior authorization or second opinion is needed.  Please schedule a pre op appointment with your primary physician within 7 days of surgery.  No aspirin or Ibuprofen 10 days prior to surgery.  Make arrangements to have someone drive you home from the hospital.  Follow bowel prep instructions the day before surgery.    Surgery specific and hospital information mailed to patient.    Information was placed on the surgery calendar in [place].

## 2018-02-02 ENCOUNTER — RADIANT APPOINTMENT (OUTPATIENT)
Dept: ULTRASOUND IMAGING | Facility: CLINIC | Age: 33
End: 2018-02-02
Attending: FAMILY MEDICINE
Payer: COMMERCIAL

## 2018-02-02 DIAGNOSIS — N83.209 CYST OF OVARY, UNSPECIFIED LATERALITY: ICD-10-CM

## 2018-02-02 PROCEDURE — 76856 US EXAM PELVIC COMPLETE: CPT | Performed by: OBSTETRICS & GYNECOLOGY

## 2018-02-02 PROCEDURE — 76830 TRANSVAGINAL US NON-OB: CPT | Performed by: OBSTETRICS & GYNECOLOGY

## 2018-02-12 NOTE — PROGRESS NOTES
[facility]  [address]  [address]  [facility] [address]  [phone]  Dept: [phone]    PRE-OP EVALUATION:  Today's date: 2018    Tiffanie Tate (: 1985) presents for pre-operative evaluation assessment as requested by Dr. Ramila Pak.  She requires evaluation and anesthesia risk assessment prior to undergoing surgery/procedure for treatment of uterus.  Proposed procedure: Robotic Assisted Endometriosis Resection and Loop Electrosurgical Excision Procedure DAVINCI PELVIC PROCEDURE    Date of Surgery/ Procedure: 18  Time of Surgery/ Procedure: 0745  Hospital/Surgical Facility: [facility]  Fax number for surgical facility:   Primary Physician: Ruba Perez  Type of Anesthesia Anticipated: General    Patient has a Health Care Directive or Living Will:  NO    Preop Questions 2018   1.  Do you have a history of heart attack, stroke, stent, bypass or surgery on an artery in the head, neck, heart or legs? No   2.  Do you ever have any pain or discomfort in your chest? No   3.  Do you have a history of  Heart Failure? No   4.   Are you troubled by shortness of breath when:  walking on a level surface, or up a slight hill, or at night? No   5.  Do you currently have a cold, bronchitis or other respiratory infection? No   6.  Do you have a cough, shortness of breath, or wheezing? No   7.  Do you sometimes get pains in the calves of your legs when you walk? No   8. Do you or anyone in your family have previous history of blood clots? No   9.  Do you or does anyone in your family have a serious bleeding problem such as prolonged bleeding following surgeries or cuts? No   10. Have you ever had problems with anemia or been told to take iron pills? No   11. Have you had any abnormal blood loss such as black, tarry or bloody stools, or abnormal vaginal bleeding? No   12. Have you ever had a blood transfusion? No   13. Have you or any of your relatives ever had  problems with anesthesia? No   14. Do you have sleep apnea, excessive snoring or daytime drowsiness? No   15. Do you have any prosthetic heart valves? No   16. Do you have prosthetic joints? No   17. Is there any chance that you may be pregnant? No     HPI:                                                      Brief HPI related to upcoming procedure: periods last 5-7 days, has spotting before her period for several days. Gets severe menstrual cramps, so painful she can't sleep through them. Also has loose stools, feels hot and cold, gets nauseated and often vomiting as well.     See problem list for active medical problems.  Problems all longstanding and stable, except as noted/documented.  See ROS for pertinent symptoms related to these conditions.                                                                                                      MEDICAL HISTORY:                                                      Patient Active Problem List    Diagnosis Date Noted     Cervical high risk HPV (human papillomavirus) test positive 10/04/2017     Priority: Medium     10/4/17 NIL, +HPV 16. Plan colp        Past Medical History:   Diagnosis Date     Cervical high risk HPV (human papillomavirus) test positive 10/04/2017    10/4/17 NIL, +HPV 16     History of asthma 1991    resolved after PE tubes and sinus surgery, allergy treatment     Past Surgical History:   Procedure Laterality Date     LEEP TX, CERVICAL  2007     PE TUBES  1990    and sinus procedure     Current Outpatient Prescriptions   Medication Sig Dispense Refill     cyclobenzaprine (FLEXERIL) 5 MG tablet Take 1 tablet (5 mg) by mouth 3 times daily as needed for muscle spasms (Patient not taking: Reported on 2/13/2018) 42 tablet 3     cetirizine (ZYRTEC) 10 MG tablet Take 10 mg by mouth daily       acetaminophen (TYLENOL) 500 MG tablet Take 1,000 mg by mouth every 6 hours as needed for mild pain       OTC products: None, except as noted above, no recent use of  "OTC ASA, NSAIDS or Steroids and no use of herbal medications or other supplements    Allergies   Allergen Reactions     Sulfa Drugs       Latex Allergy: NO    Social History   Substance Use Topics     Smoking status: Never Smoker     Smokeless tobacco: Never Used     Alcohol use 1.2 oz/week     2 Standard drinks or equivalent per week      Comment: occasional     History   Drug Use No       REVIEW OF SYSTEMS:                                                    C: NEGATIVE for fever, chills, change in weight  I: NEGATIVE for worrisome rashes, moles or lesions  E: NEGATIVE for vision changes or irritation  E/M: NEGATIVE for ear, mouth and throat problems  R: NEGATIVE for significant cough or SOB  B: NEGATIVE for masses, tenderness or discharge  CV: NEGATIVE for chest pain, palpitations or peripheral edema  GI: NEGATIVE for nausea, abdominal pain, heartburn, or change in bowel habits  : NEGATIVE for frequency, dysuria, or hematuria  MUSCULOSKELETAL:occasional knee pain with walking  N: NEGATIVE for weakness, dizziness or paresthesias  E: NEGATIVE for temperature intolerance, skin/hair changes  H: NEGATIVE for bleeding problems  P: NEGATIVE for changes in mood or affect    EXAM:                                                    /60 (Cuff Size: Adult Large)  Pulse 100  Temp 98.1  F (36.7  C) (Oral)  Ht 5' 5.5\" (1.664 m)  Wt 212 lb 14.4 oz (96.6 kg)  SpO2 98%  BMI 34.89 kg/m2  GENERAL APPEARANCE: healthy, alert and no distress  HENT: ear canals and TM's normal and nose and mouth without ulcers or lesions  RESP: lungs clear to auscultation - no rales, rhonchi or wheezes  CV: regular rate and rhythm, normal S1 S2, no S3 or S4 and no murmur, click or rub   ABDOMEN: soft, nontender, no HSM or masses and bowel sounds normal  NEURO: Normal strength and tone, patellar DTR's 2+ and symmetric, mentation intact and speech normal    DIAGNOSTICS:                                                    EKG: Not indicated due " to non-vascular surgery and low risk of event (age <65 and without cardiac risk factors)    Recent Labs   Lab Test  10/24/17   0829  09/09/17   1118   HGB  13.1  15.5   PLT  277  233   NA  142  140   POTASSIUM  3.7  3.9   CR  0.70  0.59        IMPRESSION:                                                    Reason for surgery/procedure: severe dysmenorrhea, suspected endometriosis  Diagnosis/reason for consult: preoperative evaluation    The proposed surgical procedure is considered INTERMEDIATE risk.    REVISED CARDIAC RISK INDEX  The patient has the following serious cardiovascular risks for perioperative complications such as (MI, PE, VFib and 3  AV Block):  No serious cardiac risks  INTERPRETATION: 0 risks: Class I (very low risk - 0.4% complication rate)    The patient has the following additional risks for perioperative complications:  No identified additional risks      ICD-10-CM    1. Preop general physical exam Z01.818    2. Dysmenorrhea N94.6    3. Pain in both knees, unspecified chronicity M25.561 ORTHO  REFERRAL    M25.562        RECOMMENDATIONS:                                                          --Patient is not on scheduled medications. May use flexeril if needed in perioperative period.    Discussed her bilateral knee pain, which should not affect surgery, but did referral for sports medicine which can be done after surgery, if she prefers.     APPROVAL GIVEN to proceed with proposed procedure, without further diagnostic evaluation       Signed Electronically by: Ximena Sweet MD    Copy of this evaluation report is provided to requesting physician.    [place] Preop Guidelines

## 2018-02-13 ENCOUNTER — OFFICE VISIT (OUTPATIENT)
Dept: PEDIATRICS | Facility: CLINIC | Age: 33
End: 2018-02-13
Payer: COMMERCIAL

## 2018-02-13 VITALS
HEIGHT: 66 IN | HEART RATE: 100 BPM | OXYGEN SATURATION: 98 % | TEMPERATURE: 98.1 F | BODY MASS INDEX: 34.22 KG/M2 | WEIGHT: 212.9 LBS | DIASTOLIC BLOOD PRESSURE: 60 MMHG | SYSTOLIC BLOOD PRESSURE: 104 MMHG

## 2018-02-13 DIAGNOSIS — N94.6 DYSMENORRHEA: ICD-10-CM

## 2018-02-13 DIAGNOSIS — M25.562 PAIN IN BOTH KNEES, UNSPECIFIED CHRONICITY: ICD-10-CM

## 2018-02-13 DIAGNOSIS — M25.561 PAIN IN BOTH KNEES, UNSPECIFIED CHRONICITY: ICD-10-CM

## 2018-02-13 DIAGNOSIS — Z01.818 PREOP GENERAL PHYSICAL EXAM: Primary | ICD-10-CM

## 2018-02-13 PROCEDURE — 99214 OFFICE O/P EST MOD 30 MIN: CPT | Performed by: INTERNAL MEDICINE

## 2018-02-13 NOTE — MR AVS SNAPSHOT
After Visit Summary   2/13/2018    Tiffanie Tate    MRN: [id number]           Patient Information     Date Of Birth          1985        Visit Information        Provider Department      2/13/2018 8:10 AM Ximena Sweet MD [facility]        Today's Diagnoses     Preop general physical exam    -  1    Dysmenorrhea        Pain in both knees, unspecified chronicity          Care Instructions      Before Your Surgery      Call your surgeon if there is any change in your health. This includes signs of a cold or flu (such as a sore throat, runny nose, cough, rash or fever).    Do not smoke, drink alcohol or take over the counter medicine (unless your surgeon or primary care doctor tells you to) for the 24 hours before and after surgery.    If you take prescribed drugs: Follow your doctor s orders about which medicines to take and which to stop until after surgery.    Eating and drinking prior to surgery: follow the instructions from your surgeon    Take a shower or bath the night before surgery. Use the soap your surgeon gave you to gently clean your skin. If you do not have soap from your surgeon, use your regular soap. Do not shave or scrub the surgery site.  Wear clean pajamas and have clean sheets on your bed.           Follow-ups after your visit        Additional Services     ORTHO  REFERRAL       [facility] is referring you to the Orthopedic  Services at [facility].       The  Representative will assist you in the coordination of your Orthopedic and Musculoskeletal Care as prescribed by your physician.    The  Representative will call you within 1 business day to help schedule your appointment, or you may contact the  Representative at:    All areas ~ [phone]     Type of Referral : Non Surgical       Timeframe requested: Routine    Coverage of these services is subject to the terms  and limitations of your health insurance plan.  Please call member services at your health plan with any benefit or coverage questions.      If X-rays, CT or MRI's have been performed, please contact the facility where they were done to arrange for , prior to your scheduled appointment.  Please bring this referral request to your appointment and present it to your specialist.                  Your next 10 appointments already scheduled     Feb 15, 2018  1:45 PM CST   Colposcopy with Ramila Pak DO, RI PROC RM 2   [facility]    [address]  [facility] [address] [phone]            Feb 21, 2018   Procedure with Ramila Pak DO   [facility] PeriOp Services (--)    [address]  [facility] [address] [phone]              Who to contact     If you have questions or need follow up information about today's clinic visit or your schedule please contact [facility] directly at [phone].  Normal or non-critical lab and imaging results will be communicated to you by MyChart, letter or phone within 4 business days after the clinic has received the results. If you do not hear from us within 7 days, please contact the clinic through MyChart or phone. If you have a critical or abnormal lab result, we will notify you by phone as soon as possible.  Submit refill requests through Zwipe or call your pharmacy and they will forward the refill request to us. Please allow 3 business days for your refill to be completed.          Additional Information About Your Visit        Zwipe Information     Zwipe gives you secure access to your electronic health record. If you see a primary care provider, you can also send messages to your care team and make appointments. If you have questions, please call your primary care clinic.  If you do not have a primary care provider, please call [phone] and they will  "assist you.        Care EveryWhere ID     This is your Care EveryWhere ID. This could be used by other organizations to access your [place] medical records  [id number]        Your Vitals Were     Pulse Temperature Height Pulse Oximetry BMI (Body Mass Index)       100 98.1  F (36.7  C) (Oral) 5' 5.5\" (1.664 m) 98% 34.89 kg/m2        Blood Pressure from Last 3 Encounters:   02/13/18 104/60   10/04/17 100/64   09/19/17 100/60    Weight from Last 3 Encounters:   02/13/18 212 lb 14.4 oz (96.6 kg)   10/04/17 207 lb 9.6 oz (94.2 kg)   09/19/17 209 lb 6.4 oz (95 kg)              We Performed the Following     ORTHO  REFERRAL        Primary Care Provider Office Phone # Fax #    Ruba Perez [phone] [address] [place] [address]        Equal Access to Services     [facility]: Hadii aad ku hadasho Soomaali, waaxda luqadaha, qaybta kaalmada adeegyada, waxay idiin hayaan adeeg kharash la'dulcen . So [facility] [phone].    ATENCIÓN: Si habla español, tiene a iyer disposición servicios gratuitos de asistencia lingüística. [facility] [phone].    We comply with applicable federal civil rights laws and [place] laws. We do not discriminate on the basis of race, color, national origin, age, disability, sex, sexual orientation, or gender identity.            Thank you!     Thank you for choosing [facility]  for your care. Our goal is always to provide you with excellent care. Hearing back from our patients is one way we can continue to improve our services. Please take a few minutes to complete the written survey that you may receive in the mail after your visit with us. Thank you!             Your Updated Medication List - Protect others around you: Learn how to safely use, store and throw away your medicines at www.disposemymeds.org.          This list is accurate as of 2/13/18  9:02 AM.  Always use your most recent med list.                   Brand Name Dispense " Instructions for use Diagnosis    cetirizine 10 MG tablet    zyrTEC     Take 10 mg by mouth daily        cyclobenzaprine 5 MG tablet    FLEXERIL    42 tablet    Take 1 tablet (5 mg) by mouth 3 times daily as needed for muscle spasms    Pelvic pain in female       TYLENOL 500 MG tablet   Generic drug:  acetaminophen      Take 1,000 mg by mouth every 6 hours as needed for mild pain

## 2018-02-13 NOTE — NURSING NOTE
"Chief Complaint   Patient presents with     Pre-Op Exam       Initial /60 (Cuff Size: Adult Large)  Pulse 100  Temp 98.1  F (36.7  C) (Oral)  Ht 5' 5.5\" (1.664 m)  Wt 212 lb 14.4 oz (96.6 kg)  SpO2 98%  BMI 34.89 kg/m2 Estimated body mass index is 34.89 kg/(m^2) as calculated from the following:    Height as of this encounter: 5' 5.5\" (1.664 m).    Weight as of this encounter: 212 lb 14.4 oz (96.6 kg).  Medication Reconciliation: complete   Zenaida Rust LPN    "

## 2018-02-15 ENCOUNTER — OFFICE VISIT (OUTPATIENT)
Dept: OBGYN | Facility: CLINIC | Age: 33
End: 2018-02-15
Payer: COMMERCIAL

## 2018-02-15 VITALS
SYSTOLIC BLOOD PRESSURE: 110 MMHG | HEIGHT: 66 IN | WEIGHT: 213.5 LBS | BODY MASS INDEX: 34.31 KG/M2 | DIASTOLIC BLOOD PRESSURE: 60 MMHG

## 2018-02-15 DIAGNOSIS — B97.7 HPV (HUMAN PAPILLOMA VIRUS) INFECTION: Primary | ICD-10-CM

## 2018-02-15 PROCEDURE — 88305 TISSUE EXAM BY PATHOLOGIST: CPT | Performed by: FAMILY MEDICINE

## 2018-02-15 PROCEDURE — 88342 IMHCHEM/IMCYTCHM 1ST ANTB: CPT | Performed by: FAMILY MEDICINE

## 2018-02-15 PROCEDURE — 57454 BX/CURETT OF CERVIX W/SCOPE: CPT | Performed by: FAMILY MEDICINE

## 2018-02-15 NOTE — NURSING NOTE
"Chief Complaint   Patient presents with     Colposcopy       Initial /60  Ht 5' 5.5\" (1.664 m)  Wt 213 lb 8 oz (96.8 kg)  LMP 02/15/2018  BMI 34.99 kg/m2 Estimated body mass index is 34.99 kg/(m^2) as calculated from the following:    Height as of this encounter: 5' 5.5\" (1.664 m).    Weight as of this encounter: 213 lb 8 oz (96.8 kg).  Medication Reconciliation: complete   Ashley Jang CMA      "

## 2018-02-15 NOTE — PATIENT INSTRUCTIONS
Will call with results   Dr. Ramila Pak, DO    Obstetrics and Gynecology  [facility]

## 2018-02-15 NOTE — MR AVS SNAPSHOT
After Visit Summary   2/15/2018    Tiffanie Tate    MRN: [id number]           Patient Information     Date Of Birth          1985        Visit Information        Provider Department      2/15/2018 1:45 PM Ramila Pak DO; RI PROC RM 2 [facility]        Today's Diagnoses     HPV (human papilloma virus) infection    -  1      Care Instructions    Will call with results   Dr. Ramila Pak DO    Obstetrics and Gynecology  [facility]                 Follow-ups after your visit        Your next 10 appointments already scheduled     Feb 21, 2018   Procedure with Ramila Pak DO   [facility] PeriOp Services (--)    [address]  [facility] [address] [phone]            Feb 27, 2018 10:45 AM CST   SHORT with Ramila Pak DO   [facility] ([facility])    [address]  [facility] [id number] [phone]              Who to contact     If you have questions or need follow up information about today's clinic visit or your schedule please contact [facility] directly at [phone].  Normal or non-critical lab and imaging results will be communicated to you by MyChart, letter or phone within 4 business days after the clinic has received the results. If you do not hear from us within 7 days, please contact the clinic through [employer] or phone. If you have a critical or abnormal lab result, we will notify you by phone as soon as possible.  Submit refill requests through IdealSeat or call your pharmacy and they will forward the refill request to us. Please allow 3 business days for your refill to be completed.          Additional Information About Your Visit        [employer] Information     I[employer] gives you secure access to your electronic health record. If you see a primary care provider, you can also send messages to your care team and make  "appointments. If you have questions, please call your primary care clinic.  If you do not have a primary care provider, please call [phone] and they will assist you.        Care EveryWhere ID     This is your Care EveryWhere ID. This could be used by other organizations to access your [place] medical records  [id number]        Your Vitals Were     Height Last Period BMI (Body Mass Index)             5' 5.5\" (1.664 m) 02/15/2018 34.99 kg/m2          Blood Pressure from Last 3 Encounters:   02/15/18 110/60   02/13/18 104/60   10/04/17 100/64    Weight from Last 3 Encounters:   02/15/18 213 lb 8 oz (96.8 kg)   02/13/18 212 lb 14.4 oz (96.6 kg)   10/04/17 207 lb 9.6 oz (94.2 kg)              We Performed the Following     COLP VAGINA W CERVIX IF PRES W BIOPSY     Surgical pathology exam        Primary Care Provider Office Phone # Fax #    Ruba Perez [phone] [address] [place] [address]        Equal Access to Services     [facility]: Hadii aad ku hadasho Sobriana, waaxda luqadaha, qaybta kaalmada davy, [facility] . So [facility] [phone].    ATENCIÓN: Si habla español, tiene a iyer disposición servicios gratuitos de asistencia lingüística. [facility] [phone].    We comply with applicable federal civil rights laws and [place] laws. We do not discriminate on the basis of race, color, national origin, age, disability, sex, sexual orientation, or gender identity.            Thank you!     Thank you for choosing [facility]  for your care. Our goal is always to provide you with excellent care. Hearing back from our patients is one way we can continue to improve our services. Please take a few minutes to complete the written survey that you may receive in the mail after your visit with us. Thank you!             Your Updated Medication List - Protect others around you: Learn how to safely use, store and throw away " your medicines at www.disposemymeds.org.          This list is accurate as of 2/15/18  2:47 PM.  Always use your most recent med list.                   Brand Name Dispense Instructions for use Diagnosis    cetirizine 10 MG tablet    zyrTEC     Take 10 mg by mouth daily        cyclobenzaprine 5 MG tablet    FLEXERIL    42 tablet    Take 1 tablet (5 mg) by mouth 3 times daily as needed for muscle spasms    Pelvic pain in female       TYLENOL 500 MG tablet   Generic drug:  acetaminophen      Take 1,000 mg by mouth every 6 hours as needed for mild pain

## 2018-02-15 NOTE — PROGRESS NOTES
Tiffanie Tate is a 32 year old who presents for colposcopy for HR HPV 16 with normal pap.     GYNECOLOGIC HISTORY   Menarche: Age 13    Patient is scheduled for endometriosis resection and LEEP (if needed from today's results) on 2/21. Desires chromotubation at this time.     This document serves as a record of the services and decisions personally performed and made by Ramila Pak DO. It was created on his/her behalf by Cathy Martin, a trained medical scribe. The creation of this document is based the provider's statements to the medical scribe.  Scribe Cathy Martin 2:24 PM, February 15, 2018    Risks and benefits were explained in detail prior to procedure including bleeding, infection and possible need for further treatment.  Informed consent was obtained to proceed.    Procedure/Findings:  Patient was placed in dorsal lithotomy position. Speculum was inserted. Gross examination of the cervix revealed pathology.  Acetic acid and Lugol's solution applied.    1. Colposcopy adequate:  Yes     2. Squamocolumnar junction visibility: completely visible    3. Normal colposcopic findings:      Original squamous epithelium:  mature    Other:  none    4. Abnormal colposcopic findings:  yes    Location of lesion:  inside transformation zone at 12, 6, 3 o'clock    Size of lesion:  3 quadrants,  2% of cervix    Grade 1 (minor):  yes     Details:   fine mosaicism, fine punctation and thin acetowhite epithelium    Grade 2 (major):   no     Details:    none    Non-specific:         Leukoplakia: no        Erosion: no    Lugol's staining:  Non-stained    Suspicious for invasion: no     Details:  none    Miscellaneous findings: none    5.  3 biopsies were obtained at 12, 6, 3 o'clock.     ECC was performed.     Hemostasis was achieved with monsels.        Procedure course: Patient tolerated procedure well  Assessment:  Normal exam without visible pathology, DESMOND 1, DESMOND 2 and DESMOND 3  Plan:   if no dysplasia, repeat  pap in 6/12 months, if CIN1, recommend    Repeat pap in 6/12 months or HPV in 1 year and if CIN2-3     recommend LEEP, Cryo or Repeat pap/colposcopy in 6/12    months    The information in this document, created by the medical scribe for me, accurately reflects the services I personally performed and the decisions made by me. I have reviewed and approved this document for accuracy prior to leaving the patient care area.  Ramila Pak DO  2:30 PM, 02/15/18    Dr. Ramila Pak, DO    OB/GYN   [facility]     2011 Colposcopic Terminology of the International Federation for Cervical Pathology and Colposcopy  Obstetrics and Gynecology, VOL. 120, NO. 1, JULY 2012

## 2018-02-20 LAB — COPATH REPORT: NORMAL

## 2018-02-20 NOTE — H&P (VIEW-ONLY)
[facility]  [address]  [address]  [facility] [address]  [phone]  Dept: [phone]    PRE-OP EVALUATION:  Today's date: 2018    Tiffanie Tate (: 1985) presents for pre-operative evaluation assessment as requested by Dr. Ramila Pak.  She requires evaluation and anesthesia risk assessment prior to undergoing surgery/procedure for treatment of uterus.  Proposed procedure: Robotic Assisted Endometriosis Resection and Loop Electrosurgical Excision Procedure DAVINCI PELVIC PROCEDURE    Date of Surgery/ Procedure: 18  Time of Surgery/ Procedure: 0745  [facility]  Fax number for surgical facility:   Primary Physician: Ruba Perez  Type of Anesthesia Anticipated: General    Patient has a Health Care Directive or Living Will:  NO    Preop Questions 2018   1.  Do you have a history of heart attack, stroke, stent, bypass or surgery on an artery in the head, neck, heart or legs? No   2.  Do you ever have any pain or discomfort in your chest? No   3.  Do you have a history of  Heart Failure? No   4.   Are you troubled by shortness of breath when:  walking on a level surface, or up a slight hill, or at night? No   5.  Do you currently have a cold, bronchitis or other respiratory infection? No   6.  Do you have a cough, shortness of breath, or wheezing? No   7.  Do you sometimes get pains in the calves of your legs when you walk? No   8. Do you or anyone in your family have previous history of blood clots? No   9.  Do you or does anyone in your family have a serious bleeding problem such as prolonged bleeding following surgeries or cuts? No   10. Have you ever had problems with anemia or been told to take iron pills? No   11. Have you had any abnormal blood loss such as black, tarry or bloody stools, or abnormal vaginal bleeding? No   12. Have you ever had a blood transfusion? No   13. Have you or any of your relatives ever had  problems with anesthesia? No   14. Do you have sleep apnea, excessive snoring or daytime drowsiness? No   15. Do you have any prosthetic heart valves? No   16. Do you have prosthetic joints? No   17. Is there any chance that you may be pregnant? No     HPI:                                                      Brief HPI related to upcoming procedure: periods last 5-7 days, has spotting before her period for several days. Gets severe menstrual cramps, so painful she can't sleep through them. Also has loose stools, feels hot and cold, gets nauseated and often vomiting as well.     See problem list for active medical problems.  Problems all longstanding and stable, except as noted/documented.  See ROS for pertinent symptoms related to these conditions.                                                                                                      MEDICAL HISTORY:                                                      Patient Active Problem List    Diagnosis Date Noted     Cervical high risk HPV (human papillomavirus) test positive 10/04/2017     Priority: Medium     10/4/17 NIL, +HPV 16. Plan colp        Past Medical History:   Diagnosis Date     Cervical high risk HPV (human papillomavirus) test positive 10/04/2017    10/4/17 NIL, +HPV 16     History of asthma 1991    resolved after PE tubes and sinus surgery, allergy treatment     Past Surgical History:   Procedure Laterality Date     LEEP TX, CERVICAL  2007     PE TUBES  1990    and sinus procedure     Current Outpatient Prescriptions   Medication Sig Dispense Refill     cyclobenzaprine (FLEXERIL) 5 MG tablet Take 1 tablet (5 mg) by mouth 3 times daily as needed for muscle spasms (Patient not taking: Reported on 2/13/2018) 42 tablet 3     cetirizine (ZYRTEC) 10 MG tablet Take 10 mg by mouth daily       acetaminophen (TYLENOL) 500 MG tablet Take 1,000 mg by mouth every 6 hours as needed for mild pain       OTC products: None, except as noted above, no recent use of  "OTC ASA, NSAIDS or Steroids and no use of herbal medications or other supplements    Allergies   Allergen Reactions     Sulfa Drugs       Latex Allergy: NO    Social History   Substance Use Topics     Smoking status: Never Smoker     Smokeless tobacco: Never Used     Alcohol use 1.2 oz/week     2 Standard drinks or equivalent per week      Comment: occasional     History   Drug Use No       REVIEW OF SYSTEMS:                                                    C: NEGATIVE for fever, chills, change in weight  I: NEGATIVE for worrisome rashes, moles or lesions  E: NEGATIVE for vision changes or irritation  E/M: NEGATIVE for ear, mouth and throat problems  R: NEGATIVE for significant cough or SOB  B: NEGATIVE for masses, tenderness or discharge  CV: NEGATIVE for chest pain, palpitations or peripheral edema  GI: NEGATIVE for nausea, abdominal pain, heartburn, or change in bowel habits  : NEGATIVE for frequency, dysuria, or hematuria  MUSCULOSKELETAL:occasional knee pain with walking  N: NEGATIVE for weakness, dizziness or paresthesias  E: NEGATIVE for temperature intolerance, skin/hair changes  H: NEGATIVE for bleeding problems  P: NEGATIVE for changes in mood or affect    EXAM:                                                    /60 (Cuff Size: Adult Large)  Pulse 100  Temp 98.1  F (36.7  C) (Oral)  Ht 5' 5.5\" (1.664 m)  Wt 212 lb 14.4 oz (96.6 kg)  SpO2 98%  BMI 34.89 kg/m2  GENERAL APPEARANCE: healthy, alert and no distress  HENT: ear canals and TM's normal and nose and mouth without ulcers or lesions  RESP: lungs clear to auscultation - no rales, rhonchi or wheezes  CV: regular rate and rhythm, normal S1 S2, no S3 or S4 and no murmur, click or rub   ABDOMEN: soft, nontender, no HSM or masses and bowel sounds normal  NEURO: Normal strength and tone, patellar DTR's 2+ and symmetric, mentation intact and speech normal    DIAGNOSTICS:                                                    EKG: Not indicated due " to non-vascular surgery and low risk of event (age <65 and without cardiac risk factors)    Recent Labs   Lab Test  10/24/17   0829  09/09/17   1118   HGB  13.1  15.5   PLT  277  233   NA  142  140   POTASSIUM  3.7  3.9   CR  0.70  0.59        IMPRESSION:                                                    Reason for surgery/procedure: severe dysmenorrhea, suspected endometriosis  Diagnosis/reason for consult: preoperative evaluation    The proposed surgical procedure is considered INTERMEDIATE risk.    REVISED CARDIAC RISK INDEX  The patient has the following serious cardiovascular risks for perioperative complications such as (MI, PE, VFib and 3  AV Block):  No serious cardiac risks  INTERPRETATION: 0 risks: Class I (very low risk - 0.4% complication rate)    The patient has the following additional risks for perioperative complications:  No identified additional risks      ICD-10-CM    1. Preop general physical exam Z01.818    2. Dysmenorrhea N94.6    3. Pain in both knees, unspecified chronicity M25.561 ORTHO  REFERRAL    M25.562        RECOMMENDATIONS:                                                          --Patient is not on scheduled medications. May use flexeril if needed in perioperative period.    Discussed her bilateral knee pain, which should not affect surgery, but did referral for sports medicine which can be done after surgery, if she prefers.     APPROVAL GIVEN to proceed with proposed procedure, without further diagnostic evaluation       Signed Electronically by: Ximena Sweet MD    Copy of this evaluation report is provided to requesting physician.    [facility] Preop Guidelines

## 2018-02-21 ENCOUNTER — ANESTHESIA (OUTPATIENT)
Dept: SURGERY | Facility: CLINIC | Age: 33
End: 2018-02-21
Payer: COMMERCIAL

## 2018-02-21 ENCOUNTER — ANESTHESIA EVENT (OUTPATIENT)
Dept: SURGERY | Facility: CLINIC | Age: 33
End: 2018-02-21
Payer: COMMERCIAL

## 2018-02-21 ENCOUNTER — HOSPITAL ENCOUNTER (OUTPATIENT)
Facility: CLINIC | Age: 33
Discharge: HOME | End: 2018-02-21
Attending: FAMILY MEDICINE | Admitting: FAMILY MEDICINE
Payer: COMMERCIAL

## 2018-02-21 VITALS
OXYGEN SATURATION: 99 % | RESPIRATION RATE: 16 BRPM | DIASTOLIC BLOOD PRESSURE: 86 MMHG | SYSTOLIC BLOOD PRESSURE: 123 MMHG | TEMPERATURE: 99.9 F | HEIGHT: 65 IN | HEART RATE: 99 BPM | BODY MASS INDEX: 35.65 KG/M2 | WEIGHT: 214 LBS

## 2018-02-21 DIAGNOSIS — Z98.890 S/P LAPAROSCOPY: Primary | ICD-10-CM

## 2018-02-21 LAB
HCG UR QL: NEGATIVE
HGB BLD-MCNC: 13.4 G/DL (ref 11.7–15.7)

## 2018-02-21 PROCEDURE — 25000566 ZZH SEVOFLURANE, EA 15 MIN: Performed by: FAMILY MEDICINE

## 2018-02-21 PROCEDURE — 37000009 ZZH ANESTHESIA TECHNICAL FEE, EACH ADDTL 15 MIN: Performed by: FAMILY MEDICINE

## 2018-02-21 PROCEDURE — 71000013 ZZH RECOVERY PHASE 1 LEVEL 1 EA ADDTL HR: Performed by: FAMILY MEDICINE

## 2018-02-21 PROCEDURE — 40000306 ZZH STATISTIC PRE PROC ASSESS II: Performed by: FAMILY MEDICINE

## 2018-02-21 PROCEDURE — 81025 URINE PREGNANCY TEST: CPT | Performed by: FAMILY MEDICINE

## 2018-02-21 PROCEDURE — 88305 TISSUE EXAM BY PATHOLOGIST: CPT | Performed by: FAMILY MEDICINE

## 2018-02-21 PROCEDURE — 25000132 ZZH RX MED GY IP 250 OP 250 PS 637: Performed by: ANESTHESIOLOGY

## 2018-02-21 PROCEDURE — 57522 CONIZATION OF CERVIX: CPT | Mod: 51 | Performed by: FAMILY MEDICINE

## 2018-02-21 PROCEDURE — 37000008 ZZH ANESTHESIA TECHNICAL FEE, 1ST 30 MIN: Performed by: FAMILY MEDICINE

## 2018-02-21 PROCEDURE — 25000128 H RX IP 250 OP 636: Performed by: NURSE ANESTHETIST, CERTIFIED REGISTERED

## 2018-02-21 PROCEDURE — 25000128 H RX IP 250 OP 636: Performed by: ANESTHESIOLOGY

## 2018-02-21 PROCEDURE — 25000125 ZZHC RX 250: Performed by: FAMILY MEDICINE

## 2018-02-21 PROCEDURE — 36000085 ZZH SURGERY LEVEL 8 1ST 30 MIN: Performed by: FAMILY MEDICINE

## 2018-02-21 PROCEDURE — C1765 ADHESION BARRIER: HCPCS | Performed by: FAMILY MEDICINE

## 2018-02-21 PROCEDURE — 88307 TISSUE EXAM BY PATHOLOGIST: CPT | Performed by: FAMILY MEDICINE

## 2018-02-21 PROCEDURE — 58662 LAPAROSCOPY EXCISE LESIONS: CPT | Performed by: FAMILY MEDICINE

## 2018-02-21 PROCEDURE — 71000012 ZZH RECOVERY PHASE 1 LEVEL 1 FIRST HR: Performed by: FAMILY MEDICINE

## 2018-02-21 PROCEDURE — 88307 TISSUE EXAM BY PATHOLOGIST: CPT | Mod: 26 | Performed by: FAMILY MEDICINE

## 2018-02-21 PROCEDURE — 36415 COLL VENOUS BLD VENIPUNCTURE: CPT | Performed by: FAMILY MEDICINE

## 2018-02-21 PROCEDURE — 27210794 ZZH OR GENERAL SUPPLY STERILE: Performed by: FAMILY MEDICINE

## 2018-02-21 PROCEDURE — 88305 TISSUE EXAM BY PATHOLOGIST: CPT | Mod: 26 | Performed by: FAMILY MEDICINE

## 2018-02-21 PROCEDURE — 25000128 H RX IP 250 OP 636: Performed by: FAMILY MEDICINE

## 2018-02-21 PROCEDURE — 36000087 ZZH SURGERY LEVEL 8 EA 15 ADDTL MIN: Performed by: FAMILY MEDICINE

## 2018-02-21 PROCEDURE — 71000027 ZZH RECOVERY PHASE 2 EACH 15 MINS: Performed by: FAMILY MEDICINE

## 2018-02-21 PROCEDURE — 85018 HEMOGLOBIN: CPT | Performed by: FAMILY MEDICINE

## 2018-02-21 PROCEDURE — 25000125 ZZHC RX 250: Performed by: NURSE ANESTHETIST, CERTIFIED REGISTERED

## 2018-02-21 PROCEDURE — 25000132 ZZH RX MED GY IP 250 OP 250 PS 637: Performed by: FAMILY MEDICINE

## 2018-02-21 RX ORDER — IBUPROFEN 800 MG/1
800 TABLET, FILM COATED ORAL EVERY 8 HOURS PRN
Qty: 90 TABLET | Refills: 1 | Status: SHIPPED | OUTPATIENT
Start: 2018-02-21 | End: 2019-06-28

## 2018-02-21 RX ORDER — OXYCODONE HYDROCHLORIDE 5 MG/1
5 TABLET ORAL ONCE
Status: COMPLETED | OUTPATIENT
Start: 2018-02-21 | End: 2018-02-21

## 2018-02-21 RX ORDER — FENTANYL CITRATE 50 UG/ML
25-50 INJECTION, SOLUTION INTRAMUSCULAR; INTRAVENOUS
Status: DISCONTINUED | OUTPATIENT
Start: 2018-02-21 | End: 2018-02-21 | Stop reason: HOSPADM

## 2018-02-21 RX ORDER — ONDANSETRON 2 MG/ML
INJECTION INTRAMUSCULAR; INTRAVENOUS PRN
Status: DISCONTINUED | OUTPATIENT
Start: 2018-02-21 | End: 2018-02-21

## 2018-02-21 RX ORDER — HYDROMORPHONE HYDROCHLORIDE 1 MG/ML
.3-.5 INJECTION, SOLUTION INTRAMUSCULAR; INTRAVENOUS; SUBCUTANEOUS EVERY 10 MIN PRN
Status: DISCONTINUED | OUTPATIENT
Start: 2018-02-21 | End: 2018-02-21 | Stop reason: HOSPADM

## 2018-02-21 RX ORDER — BUPIVACAINE HYDROCHLORIDE 2.5 MG/ML
INJECTION, SOLUTION INFILTRATION; PERINEURAL PRN
Status: DISCONTINUED | OUTPATIENT
Start: 2018-02-21 | End: 2018-02-21 | Stop reason: HOSPADM

## 2018-02-21 RX ORDER — SODIUM CHLORIDE, SODIUM LACTATE, POTASSIUM CHLORIDE, CALCIUM CHLORIDE 600; 310; 30; 20 MG/100ML; MG/100ML; MG/100ML; MG/100ML
INJECTION, SOLUTION INTRAVENOUS CONTINUOUS
Status: DISCONTINUED | OUTPATIENT
Start: 2018-02-21 | End: 2018-02-21 | Stop reason: HOSPADM

## 2018-02-21 RX ORDER — BUPIVACAINE HYDROCHLORIDE AND EPINEPHRINE 5; 5 MG/ML; UG/ML
INJECTION, SOLUTION PERINEURAL PRN
Status: DISCONTINUED | OUTPATIENT
Start: 2018-02-21 | End: 2018-02-21 | Stop reason: HOSPADM

## 2018-02-21 RX ORDER — CEFAZOLIN SODIUM 1 G/3ML
1 INJECTION, POWDER, FOR SOLUTION INTRAMUSCULAR; INTRAVENOUS SEE ADMIN INSTRUCTIONS
Status: DISCONTINUED | OUTPATIENT
Start: 2018-02-21 | End: 2018-02-21 | Stop reason: HOSPADM

## 2018-02-21 RX ORDER — ACETAMINOPHEN 325 MG/1
975 TABLET ORAL ONCE
Status: COMPLETED | OUTPATIENT
Start: 2018-02-21 | End: 2018-02-21

## 2018-02-21 RX ORDER — LABETALOL HYDROCHLORIDE 5 MG/ML
10 INJECTION, SOLUTION INTRAVENOUS
Status: DISCONTINUED | OUTPATIENT
Start: 2018-02-21 | End: 2018-02-21 | Stop reason: HOSPADM

## 2018-02-21 RX ORDER — DEXAMETHASONE SODIUM PHOSPHATE 4 MG/ML
INJECTION, SOLUTION INTRA-ARTICULAR; INTRALESIONAL; INTRAMUSCULAR; INTRAVENOUS; SOFT TISSUE PRN
Status: DISCONTINUED | OUTPATIENT
Start: 2018-02-21 | End: 2018-02-21

## 2018-02-21 RX ORDER — ASPIRIN 81 MG
100 TABLET, DELAYED RELEASE (ENTERIC COATED) ORAL DAILY
Qty: 60 TABLET | Refills: 1 | Status: SHIPPED | OUTPATIENT
Start: 2018-02-21 | End: 2018-09-06

## 2018-02-21 RX ORDER — MEPERIDINE HYDROCHLORIDE 25 MG/ML
12.5 INJECTION INTRAMUSCULAR; INTRAVENOUS; SUBCUTANEOUS
Status: DISCONTINUED | OUTPATIENT
Start: 2018-02-21 | End: 2018-02-21 | Stop reason: HOSPADM

## 2018-02-21 RX ORDER — OXYCODONE HYDROCHLORIDE 5 MG/1
5 TABLET ORAL EVERY 4 HOURS PRN
Qty: 18 TABLET | Refills: 0 | Status: SHIPPED | OUTPATIENT
Start: 2018-02-21 | End: 2019-06-28

## 2018-02-21 RX ORDER — NEOSTIGMINE METHYLSULFATE 1 MG/ML
VIAL (ML) INJECTION PRN
Status: DISCONTINUED | OUTPATIENT
Start: 2018-02-21 | End: 2018-02-21

## 2018-02-21 RX ORDER — LIDOCAINE 40 MG/G
CREAM TOPICAL
Status: DISCONTINUED | OUTPATIENT
Start: 2018-02-21 | End: 2018-02-21 | Stop reason: HOSPADM

## 2018-02-21 RX ORDER — ONDANSETRON 2 MG/ML
4 INJECTION INTRAMUSCULAR; INTRAVENOUS EVERY 30 MIN PRN
Status: DISCONTINUED | OUTPATIENT
Start: 2018-02-21 | End: 2018-02-21 | Stop reason: HOSPADM

## 2018-02-21 RX ORDER — GLYCOPYRROLATE 0.2 MG/ML
INJECTION, SOLUTION INTRAMUSCULAR; INTRAVENOUS PRN
Status: DISCONTINUED | OUTPATIENT
Start: 2018-02-21 | End: 2018-02-21

## 2018-02-21 RX ORDER — FERRIC SUBSULFATE 0.21 G/G
LIQUID TOPICAL PRN
Status: DISCONTINUED | OUTPATIENT
Start: 2018-02-21 | End: 2018-02-21 | Stop reason: HOSPADM

## 2018-02-21 RX ORDER — NALOXONE HYDROCHLORIDE 0.4 MG/ML
.1-.4 INJECTION, SOLUTION INTRAMUSCULAR; INTRAVENOUS; SUBCUTANEOUS
Status: DISCONTINUED | OUTPATIENT
Start: 2018-02-21 | End: 2018-02-21 | Stop reason: HOSPADM

## 2018-02-21 RX ORDER — PROPOFOL 10 MG/ML
INJECTION, EMULSION INTRAVENOUS PRN
Status: DISCONTINUED | OUTPATIENT
Start: 2018-02-21 | End: 2018-02-21

## 2018-02-21 RX ORDER — CITRIC ACID/SODIUM CITRATE 334-500MG
30 SOLUTION, ORAL ORAL ONCE
Status: COMPLETED | OUTPATIENT
Start: 2018-02-21 | End: 2018-02-21

## 2018-02-21 RX ORDER — KETOROLAC TROMETHAMINE 30 MG/ML
30 INJECTION, SOLUTION INTRAMUSCULAR; INTRAVENOUS ONCE
Status: COMPLETED | OUTPATIENT
Start: 2018-02-21 | End: 2018-02-21

## 2018-02-21 RX ORDER — CEFAZOLIN SODIUM 2 G/100ML
2 INJECTION, SOLUTION INTRAVENOUS
Status: COMPLETED | OUTPATIENT
Start: 2018-02-21 | End: 2018-02-21

## 2018-02-21 RX ORDER — LIDOCAINE HYDROCHLORIDE 10 MG/ML
INJECTION, SOLUTION INFILTRATION; PERINEURAL PRN
Status: DISCONTINUED | OUTPATIENT
Start: 2018-02-21 | End: 2018-02-21

## 2018-02-21 RX ORDER — ONDANSETRON 4 MG/1
4 TABLET, ORALLY DISINTEGRATING ORAL EVERY 30 MIN PRN
Status: DISCONTINUED | OUTPATIENT
Start: 2018-02-21 | End: 2018-02-21 | Stop reason: HOSPADM

## 2018-02-21 RX ORDER — FENTANYL CITRATE 50 UG/ML
INJECTION, SOLUTION INTRAMUSCULAR; INTRAVENOUS PRN
Status: DISCONTINUED | OUTPATIENT
Start: 2018-02-21 | End: 2018-02-21

## 2018-02-21 RX ORDER — PROPOFOL 10 MG/ML
INJECTION, EMULSION INTRAVENOUS CONTINUOUS PRN
Status: DISCONTINUED | OUTPATIENT
Start: 2018-02-21 | End: 2018-02-21

## 2018-02-21 RX ADMIN — KETOROLAC TROMETHAMINE 30 MG: 30 INJECTION, SOLUTION INTRAMUSCULAR at 06:48

## 2018-02-21 RX ADMIN — CEFAZOLIN SODIUM 2 G: 2 INJECTION, SOLUTION INTRAVENOUS at 07:55

## 2018-02-21 RX ADMIN — OXYCODONE HYDROCHLORIDE 5 MG: 5 TABLET ORAL at 10:47

## 2018-02-21 RX ADMIN — FENTANYL CITRATE 100 MCG: 50 INJECTION, SOLUTION INTRAMUSCULAR; INTRAVENOUS at 07:49

## 2018-02-21 RX ADMIN — MIDAZOLAM 2 MG: 1 INJECTION INTRAMUSCULAR; INTRAVENOUS at 07:46

## 2018-02-21 RX ADMIN — Medication 3 MG: at 09:11

## 2018-02-21 RX ADMIN — SODIUM CITRATE AND CITRIC ACID MONOHYDRATE 30 ML: 500; 334 SOLUTION ORAL at 15:40

## 2018-02-21 RX ADMIN — GLYCOPYRROLATE 0.6 MG: 0.2 INJECTION, SOLUTION INTRAMUSCULAR; INTRAVENOUS at 09:11

## 2018-02-21 RX ADMIN — ROCURONIUM BROMIDE 50 MG: 10 INJECTION INTRAVENOUS at 07:49

## 2018-02-21 RX ADMIN — SODIUM CHLORIDE, POTASSIUM CHLORIDE, SODIUM LACTATE AND CALCIUM CHLORIDE: 600; 310; 30; 20 INJECTION, SOLUTION INTRAVENOUS at 07:15

## 2018-02-21 RX ADMIN — HYDROMORPHONE HYDROCHLORIDE 0.5 MG: 1 INJECTION, SOLUTION INTRAMUSCULAR; INTRAVENOUS; SUBCUTANEOUS at 08:08

## 2018-02-21 RX ADMIN — ACETAMINOPHEN 975 MG: 325 TABLET, FILM COATED ORAL at 06:46

## 2018-02-21 RX ADMIN — HYDROMORPHONE HYDROCHLORIDE 0.5 MG: 1 INJECTION, SOLUTION INTRAMUSCULAR; INTRAVENOUS; SUBCUTANEOUS at 08:14

## 2018-02-21 RX ADMIN — DEXAMETHASONE SODIUM PHOSPHATE 4 MG: 4 INJECTION, SOLUTION INTRA-ARTICULAR; INTRALESIONAL; INTRAMUSCULAR; INTRAVENOUS; SOFT TISSUE at 07:49

## 2018-02-21 RX ADMIN — PROPOFOL 200 MG: 10 INJECTION, EMULSION INTRAVENOUS at 07:50

## 2018-02-21 RX ADMIN — ONDANSETRON 4 MG: 2 INJECTION INTRAMUSCULAR; INTRAVENOUS at 09:02

## 2018-02-21 RX ADMIN — OXYCODONE HYDROCHLORIDE 5 MG: 5 TABLET ORAL at 15:40

## 2018-02-21 RX ADMIN — GLYCOPYRROLATE 0.2 MG: 0.2 INJECTION, SOLUTION INTRAMUSCULAR; INTRAVENOUS at 08:36

## 2018-02-21 RX ADMIN — SODIUM CHLORIDE, POTASSIUM CHLORIDE, SODIUM LACTATE AND CALCIUM CHLORIDE: 600; 310; 30; 20 INJECTION, SOLUTION INTRAVENOUS at 08:38

## 2018-02-21 RX ADMIN — LIDOCAINE HYDROCHLORIDE 30 MG: 10 INJECTION, SOLUTION INFILTRATION; PERINEURAL at 07:49

## 2018-02-21 RX ADMIN — PROPOFOL 35 MCG/KG/MIN: 10 INJECTION, EMULSION INTRAVENOUS at 08:24

## 2018-02-21 NOTE — BRIEF OP NOTE
[facility] Brief Operative Note    Pre-operative diagnosis: Pelvic Pain, Cervical Dysplasia    Post-operative diagnosis 33 y/o  with pelvic pain and DESMOND II, s/p robotic assisted laparoscopic endometriosis resection, fulguration, chromotubation, Loop electroexcision procedure      Procedure: Procedure(s):  Robotic Assisted Endometriosis Resection and fulgeration, Loop Electrosurgical Excision Procedure with Chromotubation - Wound Class: II-Clean Contaminated   - Wound Class: II-Clean Contaminated   Surgeon(s): Surgeon(s) and Role:  Panel 1:     * Ramila Pak DO - Primary    Panel 2:     * Ramila Pak DO - Primary   Estimated blood loss: 50 mL    Specimens:   ID Type Source Tests Collected by Time Destination   A : Right Pelvic Sidewall Tissue Pelvic Sidewall SURGICAL PATHOLOGY EXAM Ramila Pak,  2018  8:34 AM    B : Left Pelvic Sidewall Tissue Pelvis, Left SURGICAL PATHOLOGY EXAM Ramila Pak,  2018  8:36 AM    C : Posterior Cul-de-sac Tissue Pelvis SURGICAL PATHOLOGY EXAM Ramila Pak,  2018  8:39 AM    D : Cone of Cervix Tissue Cervix SURGICAL PATHOLOGY EXAM Ramila Pak,  2018  9:00 AM       Findings: Endometriotic lesions seen posterior cul-de-sac, right and left pelvic sidewall, lesions on ovaries bilaterally.  Bilateral fallopian tube patency demonstrated

## 2018-02-21 NOTE — IP AVS SNAPSHOT
MRN:[id number]                      After Visit Summary   2/21/2018    Tiffanie Tate    MRN: [id number]           Thank you!     Thank you for choosing [facility] for your care. Our goal is always to provide you with excellent care. Hearing back from our patients is one way we can continue to improve our services. Please take a few minutes to complete the written survey that you may receive in the mail after you visit. If you would like to speak to someone directly about your visit please contact Patient Relations at [phone]. Thank you!          Patient Information     Date Of Birth          1985        About your hospital stay     You were admitted on:  February 21, 2018 You last received care in the:  [facility] PreOP/PostOP    You were discharged on:  February 21, 2018       Who to Call     For medical emergencies, please call [phone].  For non-urgent questions about your medical care, please call your primary care provider or clinic, [phone]  For questions related to your surgery, please call your surgery clinic        Attending Provider     Provider Ramila Sy DO OB/Gyn       Primary Care Provider Office Phone # Fax #    Ruba Perez [phone]      Your next 10 appointments already scheduled     Feb 27, 2018 10:45 AM CST   SHORT with Ramila Pak DO   [facility]    [address] [place] [address] [phone]            Mar 07, 2018  8:40 AM CST   New Visit with Andres Smith DO   [facility] ([facility])    [address]  [address]  [facility] [address]   [phone]              Further instructions from your care team       Follow up in 1-2 weeks   Call [phone] or [phone] for appointment     Call and ask to be seen or talk to a doctor for the following: (You can go to  the ob triage button   On answering service line) .     Increased bleeding, fever, general unwell feeling or increased pain.     Dr. Ramila Pak, DO    OB/GYN   [facility]    You received Toradol, an IV form of ibuprofen (Motrin) at 6:45am.  Do not take any ibuprofen products until 12:45pm  Maximum acetaminophen (Tylenol) dose from all sources should not exceed 4 grams (4000 mg) per day. You had 975 mg at 6:45am  You had one oxycodone at 10:50 am          GENERAL ANESTHESIA OR SEDATION ADULT DISCHARGE INSTRUCTIONS   SPECIAL PRECAUTIONS FOR 24 HOURS AFTER SURGERY    IT IS NOT UNUSUAL TO FEEL LIGHT-HEADED OR FAINT, UP TO 24 HOURS AFTER SURGERY OR WHILE TAKING PAIN MEDICATION.  IF YOU HAVE THESE SYMPTOMS; SIT FOR A FEW MINUTES BEFORE STANDING AND HAVE SOMEONE ASSIST YOU WHEN YOU GET UP TO WALK OR USE THE BATHROOM.    YOU SHOULD REST AND RELAX FOR THE NEXT 24 HOURS AND YOU MUST MAKE ARRANGEMENTS TO HAVE SOMEONE STAY WITH YOU FOR AT LEAST 24 HOURS AFTER YOUR DISCHARGE.  AVOID HAZARDOUS AND STRENUOUS ACTIVITIES.  DO NOT MAKE IMPORTANT DECISIONS FOR 24 HOURS.    DO NOT DRIVE ANY VEHICLE OR OPERATE MECHANICAL EQUIPMENT FOR 24 HOURS FOLLOWING THE END OF YOUR SURGERY.  EVEN THOUGH YOU MAY FEEL NORMAL, YOUR REACTIONS MAY BE AFFECTED BY THE MEDICATION YOU HAVE RECEIVED.    DO NOT DRINK ALCOHOLIC BEVERAGES FOR 24 HOURS FOLLOWING YOUR SURGERY.    DRINK CLEAR LIQUIDS (APPLE JUICE, GINGER ALE, 7-UP, BROTH, ETC.).  PROGRESS TO YOUR REGULAR DIET AS YOU FEEL ABLE.    YOU MAY HAVE A DRY MOUTH, A SORE THROAT, MUSCLES ACHES OR TROUBLE SLEEPING.  THESE SHOULD GO AWAY AFTER 24 HOURS.    CALL YOUR DOCTOR FOR ANY OF THE FOLLOWING:  SIGNS OF INFECTION (FEVER, GROWING TENDERNESS AT THE SURGERY SITE, A LARGE AMOUNT OF DRAINAGE OR BLEEDING, SEVERE PAIN, FOUL-SMELLING DRAINAGE, REDNESS OR SWELLING.    IT HAS BEEN OVER 8 TO 10 HOURS SINCE SURGERY AND YOU ARE STILL NOT ABLE TO URINATE (PASS WATER).       TY MACEDO M.D.   CLINIC PHONE NUMBER:  [phone].  LAPAROSCOPY, HYSTEROSCOPY OR PELVISCOPY DISCHARGE INSTRUCTIONS    PLEASE RETURN TO THE CLINIC IN:  ____1 WEEK  ____2 WEEKS    MAKE THIS APPOINTMENT AFTER YOU GET HOME IF IT HAS NOT ALREADY BEEN SCHEDULED.    DO NOT DRIVE A CAR, DRINK ALCOHOL OR USE MACHINERY FOR THE NEXT 24 HOURS.  YOU SHOULD WAIT UNTIL YOU HAVE RECOVERED BEFORE MAKING ANY IMPORTANT DECISIONS.    PAIN  YOU MAY HAVE CRAMPS, SHOULDER PAIN OR A LOW BACKACHE FOR 24 TO 48 HOURS.  TYLENOL (ACETAMINOPHEN) OR MOTRIN (IBUPROFEN) MAY HELP, OR YOUR DOCTOR MAY GIVE YOU PAIN MEDICINE.  CALL YOUR DOCTOR IF PAIN CANNOT BE CONTROLLED.    BLEEDING OR VAGINAL DISCHARGE  YOU MAY HAVE SOME BLEEDING OR DISCHARGE FOR UP TO A WEEK OR LONGER.  DO NOT DOUCHE, USE TAMPONS OR HAVE SEX (INTERCOURSE) FOR ______DAYS.  CALL YOUR DOCTOR IF YOU SOAK MORE THAN ONE MAXI PAD (SANITARY NAPKIN) PER HOUR, OR IF YOU PASS LARGE BLOOD CLOTS.    FEVER  YOU MAY HAVE A LOW FEVER FOR THE FIRST TWO DAYS.  CALL YOUR DOCTOR IF IT GOES OVER 101 DEGREES.    NAUSEA  IF YOU HAVE NAUSEA (FEEL SICK TO YOUR STOMACH), STAY IN BED.  TRY DRINKING A SMALL AMOUNT OF 7-UP, TEA OR SOUP.    SWOLLEN BELLY  IF YOUR ABDOMEN (BELLY AREA) FEELS FIRM OR SWOLLEN, CALL YOUR DOCTOR.    DIZZINESS AND WEAKNESS  YOU MAY FEEL DIZZY OR WEAK FOR A FEW DAYS.  IF SO, YOU SHOULD REST OFTEN, STAND UP SLOWLY AND USE CARE WHEN CLIMBING STAIRS.    DIET AND ACTIVITY  EAT LIGHT MEALS AND DRINK PLENTY OF FLUIDS FOR THE FIRST 24 HOURS (OR LONGER, IF YOU HAVE NAUSEA).  WAIT 5 DAYS BEFORE BATHING.  SHOWERS ARE OKAY.  MOST WOMEN CAN RETURN TO WORK AFTER 24 HOURS.  YOU MAY GO BACK TO YOUR OTHER ACTIVITIES AFTER YOUR PAIN GOES AWAY.            IF YOU HAVE STITCHES  YOU MAY REMOVE YOUR BANDAGE THE DAY AFTER TREATMENT.  YOUR DOCTOR WILL TELL YOU IF YOUR STITCHES NEED TO BE REMOVED.  SOME STITCHES DISSOLVE OVER TIME.             Pending Results     Date and Time Order Name Status  "Description    2/21/2018 0834 Surgical pathology exam In process             Statement of Approval     Ordered          02/21/18 0928  I have reviewed and agree with all the recommendations and orders detailed in this document.  EFFECTIVE NOW     Approved and electronically signed by:  Ramila Pak DO             Admission Information     Date & Time Provider Department Dept. Phone    2/21/2018 Ramila Pak DO [facility] PreOP/PostOP [phone]      Your Vitals Were     Blood Pressure Pulse Temperature Respirations Height Weight    117/80 (BP Location: Right arm) 99 98  F (36.7  C) (Temporal) 20 1.651 m (5' 5\") 97.1 kg (214 lb)    Last Period Pulse Oximetry BMI (Body Mass Index)             02/15/2018 100% 35.61 kg/m2         MyChart Information     [facility] gives you secure access to your electronic health record. If you see a primary care provider, you can also send messages to your care team and make appointments. If you have questions, please call your primary care clinic.  If you do not have a primary care provider, please call [phone] and they will assist you.        Care EveryWhere ID     This is your Care EveryWhere ID. This could be used by other organizations to access your [facility] medical records  [id number]        Equal Access to Services     MARCELL RIVERA AH: Nafisa crawfordo Kristy, waaxda luqadaha, qaybta kaalmada adegeoyada, grant torres. So [facility] [phone].    ATENCIÓN: Si habla español, tiene a iyer disposición servicios gratuitos de asistencia lingüística. Llmario al [phone].    We comply with applicable federal civil rights laws and [place] laws. We do not discriminate on the basis of race, color, national origin, age, disability, sex, sexual orientation, or gender identity.               Review of your medicines      START taking        Dose / Directions    docusate sodium 100 MG tablet   Commonly known as:  COLACE   Used for:  " S/P laparoscopy        Dose:  100 mg   Take 100 mg by mouth daily   Quantity:  60 tablet   Refills:  1       ibuprofen 800 MG tablet   Commonly known as:  ADVIL/MOTRIN   Used for:  S/P laparoscopy        Dose:  800 mg   Take 1 tablet (800 mg) by mouth every 8 hours as needed for moderate pain   Quantity:  90 tablet   Refills:  1       oxyCODONE IR 5 MG tablet   Commonly known as:  ROXICODONE   Used for:  S/P laparoscopy        Dose:  5 mg   Take 1 tablet (5 mg) by mouth every 4 hours as needed for pain maximum 6 tablet(s) per day   Quantity:  18 tablet   Refills:  0         CONTINUE these medicines which have NOT CHANGED        Dose / Directions    cetirizine 10 MG tablet   Commonly known as:  zyrTEC        Dose:  10 mg   Take 10 mg by mouth daily   Refills:  0       cyclobenzaprine 5 MG tablet   Commonly known as:  FLEXERIL   Used for:  Pelvic pain in female        Dose:  5 mg   Take 1 tablet (5 mg) by mouth 3 times daily as needed for muscle spasms   Quantity:  42 tablet   Refills:  3       TYLENOL 500 MG tablet   Generic drug:  acetaminophen        Dose:  1000 mg   Take 1,000 mg by mouth every 6 hours as needed for mild pain   Refills:  0            Where to get your medicines      These medications were sent to [facility] [id number] [place] [address]     Phone:  [phone]     docusate sodium 100 MG tablet    ibuprofen 800 MG tablet         Some of these will need a paper prescription and others can be bought over the counter. Ask your nurse if you have questions.     Bring a paper prescription for each of these medications     oxyCODONE IR 5 MG tablet                Protect others around you: Learn how to safely use, store and throw away your medicines at www.disposemymeds.org.        Information about OPIOIDS     PRESCRIPTION OPIOIDS: WHAT YOU NEED TO KNOW    Prescription opioids can be used to help relieve moderate to severe pain  and are often prescribed following a surgery or injury, or for certain health conditions. These medications can be an important part of treatment but also come with serious risks. It is important to work with your health care provider to make sure you are getting the safest, most effective care.    WHAT ARE THE RISKS AND SIDE EFFECTS OF OPIOID USE?  Prescription opioids carry serious risks of addiction and overdose, especially with prolonged use. An opioid overdose, often marked by slowed breathing can cause sudden death. The use of prescription opioids can have a number of side effects as well, even when taken as directed:      Tolerance - meaning you might need to take more of a medication for the same pain relief    Physical dependence - meaning you have symptoms of withdrawal when a medication is stopped    Increased sensitivity to pain    Constipation    Nausea, vomiting, and dry mouth    Sleepiness and dizziness    Confusion    Depression    Low levels of testosterone that can result in lower sex drive, energy, and strength    Itching and sweating    RISKS ARE GREATER WITH:    History of drug misuse, substance use disorder, or overdose    Mental health conditions (such as depression or anxiety)    Sleep apnea    Older age (65 years or older)    Pregnancy    Avoid alcohol while taking prescription opioids.   Also, unless specifically advised by your health care provider, medications to avoid include:    Benzodiazepines (such as Xanax or Valium)    Muscle relaxants (such as Soma or Flexeril)    Hypnotics (such as Ambien or Lunesta)    Other prescription opioids    KNOW YOUR OPTIONS:  Talk to your health care provider about ways to manage your pain that do not involve prescription opioids. Some of these options may actually work better and have fewer risks and side effects:    Pain relievers such as acetaminophen, ibuprofen, and naproxen    Some medications that are also used for depression or  seizures    Physical therapy and exercise    Cognitive behavioral therapy, a psychological, goal-directed approach, in which patients learn how to modify physical, behavioral, and emotional triggers of pain and stress    IF YOU ARE PRESCRIBED OPIOIDS FOR PAIN:    Never take opioids in greater amounts or more often than prescribed    Follow up with your primary health care provider and work together to create a plan on how to manage your pain.    Talk about ways to help manage your pain that do not involve prescription opioids    Talk about all concerns and side effects    Help prevent misuse and abuse    Never sell or share prescription opioids    Never use another person's prescription opioids    Store prescription opioids in a secure place and out of reach of others (this may include visitors, children, friends, and family)    Visit www.cdc.gov/drugoverdose to learn about risks of opioid abuse and overdose    If you believe you may be struggling with addiction, tell your health care provider and ask for guidance or call [facility]'s National Helpline at 6-061-011-HELP    LEARN MORE / www.cdc.gov/drugoverdose/prescribing/guideline.html    Safely dispose of unused prescription opioids: Find your local drug take-back programs and more information about the importance of safe disposal at www.doseofreality.[place].gov             Medication List: This is a list of all your medications and when to take them. Check marks below indicate your daily home schedule. Keep this list as a reference.      Medications           Morning Afternoon Evening Bedtime As Needed    cetirizine 10 MG tablet   Commonly known as:  zyrTEC   Take 10 mg by mouth daily                                cyclobenzaprine 5 MG tablet   Commonly known as:  FLEXERIL   Take 1 tablet (5 mg) by mouth 3 times daily as needed for muscle spasms                                docusate sodium 100 MG tablet   Commonly known as:  COLACE   Take 100 mg by mouth daily                                 ibuprofen 800 MG tablet   Commonly known as:  ADVIL/MOTRIN   Take 1 tablet (800 mg) by mouth every 8 hours as needed for moderate pain                                oxyCODONE IR 5 MG tablet   Commonly known as:  ROXICODONE   Take 1 tablet (5 mg) by mouth every 4 hours as needed for pain maximum 6 tablet(s) per day   Last time this was given:  5 mg on 2/21/2018 10:47 AM                                TYLENOL 500 MG tablet   Take 1,000 mg by mouth every 6 hours as needed for mild pain   Last time this was given:  975 mg on 2/21/2018  6:46 AM   Generic drug:  acetaminophen

## 2018-02-21 NOTE — ANESTHESIA POSTPROCEDURE EVALUATION
Patient: Tiffanie Tate    Procedure(s):  Robotic Assisted Endometriosis Resection and fulgeration, Loop Electrosurgical Excision Procedure with Chromotubation - Wound Class: II-Clean Contaminated   - Wound Class: II-Clean Contaminated    Diagnosis:Pelvic Pain, Cervical Dysplasia   Diagnosis Additional Information: PREOPERATIVE DIAGNOSIS: 33 y/o  with pelvic pain and DESMOND II  POSTOPERATIVE DIAGNOSIS:  33 y/o  with pelvic pain and DESMOND II, with lesions suspicious for Endometriosis.      Surgeon: Dr. Pak   Assistant: Amanda Mayorga SA     PROCEDURES, :   1. Robotic assisted laparoscopic endometriosis resection and fulguration.   2. Chromotubation  3. Loop electroexcision procedure (LEEP)     INDICATIONS: 33 y/o  with pelvic pain and DESMOND II.  Due to this, I counseled her on risks, benefits, a, lternatives of procedure and I recommend laparoscopic endometriosis resection and fulguration.  Chromotubation will be done to determine patency of fallopian tubes.    DESMOND II previously proven on cervix through colposcopy therefore requiring LEEP     Anesthesia Type:  General    Note:  Anesthesia Post Evaluation    Patient location during evaluation: PACU  Patient participation: Able to fully participate in evaluation  Level of consciousness: awake and alert  Pain management: adequate  Airway patency: patent  Cardiovascular status: acceptable  Respiratory status: acceptable  Hydration status: acceptable  PONV: none     Anesthetic complications: None          Last vitals:  Vitals:    18 1130 18 1237 18 1530   BP:  109/81 130/86   Pulse:      Resp:  18 16   Temp:      SpO2: 100% 99% 97%         Electronically Signed By: Jordy Muñoz MD  2018  4:17 PM

## 2018-02-21 NOTE — IP AVS SNAPSHOT
[facility] PreOP/PostOP    [address]    [facility] [address]    Phone:  [phone]    Fax:  [phone]                                       After Visit Summary   2/21/2018    Tiffanie Tate    MRN: [id number]           After Visit Summary Signature Page     I have received my discharge instructions, and my questions have been answered. I have discussed any challenges I see with this plan with the nurse or doctor.    ..........................................................................................................................................  Patient/Patient Representative Signature      ..........................................................................................................................................  Patient Representative Print Name and Relationship to Patient    ..................................................               ................................................  Date                                            Time    ..........................................................................................................................................  Reviewed by Signature/Title    ...................................................              ..............................................  Date                                                            Time

## 2018-02-21 NOTE — OP NOTE
PREOPERATIVE DIAGNOSIS: 33 y/o  with pelvic pain and DESMOND II  POSTOPERATIVE DIAGNOSIS:  33 y/o  with pelvic pain and DESMOND II, with lesions suspicious for Endometriosis.     Surgeon: Dr. Pak   Assistant: Amanda Mayorga SA    PROCEDURES:   1. Robotic assisted laparoscopic endometriosis resection and fulguration.   2. Chromotubation  3. Loop electroexcision procedure (LEEP)    INDICATIONS: 33 y/o  with pelvic pain and DESMOND II.  Due to this, I counseled her on risks, benefits, alternatives of procedure and I recommend laparoscopic endometriosis resection and fulguration.  Chromotubation will be done to determine patency of fallopian tubes.    DESMOND II previously proven on cervix through colposcopy therefore requiring LEEP     FINDINGS: There were lesions suspicious for endometriosis in the right and left posterior cul-de-sac, endometriosis   The uterus fallopian tubes normal with bilateral patency   ovaries with endometriosis lesions  Normal cervix pre LEEP and with surgical changes post LEEP     DESCRIPTION OF PROCEDURE: After informed consent was obtained, the patient was taken to the operating room where she was placed in dorsal supine position, placed under general anesthesia without difficulty and placed in dorsal lithotomy position. Exam under anesthesia reveals a small anteverted uterus. At this time the patient was prepped and draped in normal sterile fashion. A timeout was performed. A Ramon catheter was placed without difficulty. Reading speculum placed in the patient's vaginal vault and the anterior lip of the cervix was grasped with a single-tooth tenaculum and a large V-Care manipulator was placed.  Attention then turned to the patient's abdomen where Highland clamps are placed in the umbilicus and a Veress needle was entered into the patient's abdomen. Saline flowed easily through this and at this time the syringe is removed and the gas was hooked up and 3 liters of CO2 was insufflated. A 8 mm  transverse umbilical incision was made and a 8 mm port was placed through this and the laparoscope confirmed intra-abdominal placement. Attention is then turned towards the patient's abdomen where we measured 10 cm lateral and down on the right and 10 cm latera on the left side and 8 mm incisions are made after peritoneal mapping was performed and the robotic trocar and port are placed. A 5 mm incision is made 8 cm lateral and 3 cm superior to the midline port and a 5 mm port is placed. The da Kusum was then docked and I turned my attention towards the pelvic area.  Chromotubation was  performed with bilateral fallopian tube patency demonstrated.  Ureters are identified bilaterally with peristalsis before and after endometriosis resection is done. The right and left pelvic side wall peritoneum was removed from the boundary of the IP ligament to below the ureters to the uterine artery. This entire area was removed bilaterally and sent to pathology. A lesion in the left posterior cul-de-sac is seen, the peritoneum is lifted and resected. endometriosis was fulgurated on the right and left ovary and in the distal left pelvic sidewall after tenting the peritoneum away from the ureter below. Hemostasis is seen. Irrigation is performed. Seprafilm slurry was placed in the patient's abdomen. The robot was then undocked. The trocars were removed. The skin was closed with 4-0 Monocryl in a subcuticular fashion. All port sites were closed and Dermabond was placed over the incisions. Turning attention towards the vaginal area, the Ramon catheter was removed. The V-Care was removed. The cervix is inspected using visualization with the bivalve speculum and is found to be hemostatic. A coated speculum is placed.  The anterior lip of the cervix was grasped.  The LEEP conization was then preformed with a 11 x 15 mm sized loop in such a manner to outline all abnormal areas.  The cervix infiltrated with approximately 20 cc's of 2%  lidocaine with epinepherine. The base of the LEEP was then treated with the ball cautery device and monsels solution.  Several mm's of additional nl ectocervix were treated as well.  At the completion of the procedure hemostasis was adequate and counts correct.  The patient tolerated the procedure well. Sponge, lap and needle counts were correct x2. The patient was taken out of the dorsal lithotomy position, placed in dorsal supine position, awakened from anesthesia and taken to recovery room in stable condition. No complications were apparent at time of procedure.     TY MACEDO DO

## 2018-02-21 NOTE — ANESTHESIA CARE TRANSFER NOTE
Patient: Tiffanie Tate    Procedure(s):  Robotic Assisted Endometriosis Resection and fulgeration, Loop Electrosurgical Excision Procedure with Chromotubation - Wound Class: II-Clean Contaminated   - Wound Class: II-Clean Contaminated    Diagnosis: Pelvic Pain, Cervical Dysplasia   Diagnosis Additional Information: No value filed.    Anesthesia Type:   No value filed.     Note:  Airway :Face Mask  Patient transferred to:PACU  Comments: Report and sign off to RN in PACU.  Good resps, skin pink, monitors on, VSS,  O2 via Face Tent.Handoff Report: Identifed the Patient, Identified the Reponsible Provider, Reviewed the pertinent medical history, Discussed the surgical course, Reviewed Intra-OP anesthesia mangement and issues during anesthesia, Set expectations for post-procedure period and Allowed opportunity for questions and acknowledgement of understanding      Vitals: (Last set prior to Anesthesia Care Transfer)    CRNA VITALS  2/21/2018 0854 - 2/21/2018 0933      2/21/2018             Pulse: 108    SpO2: 97 %    Resp Rate (observed): 20    EKG: NSR                Electronically Signed By: CRISTI Hendrickson CRNA  February 21, 2018  9:33 AM

## 2018-02-21 NOTE — DISCHARGE INSTRUCTIONS
Follow up in 1-2 weeks   Call [phone] or [phone] for appointment     Call and ask to be seen or talk to a doctor for the following: (You can go to the ob triage button   On answering service line) .     Increased bleeding, fever, general unwell feeling or increased pain.     Dr. Ramila Pak, DO    OB/GYN   [facility]    You received Toradol, an IV form of ibuprofen (Motrin) at 6:45am.  Do not take any ibuprofen products until 12:45pm  Maximum acetaminophen (Tylenol) dose from all sources should not exceed 4 grams (4000 mg) per day. You had 975 mg at 6:45am  You had one oxycodone at 10:50 am          GENERAL ANESTHESIA OR SEDATION ADULT DISCHARGE INSTRUCTIONS   SPECIAL PRECAUTIONS FOR 24 HOURS AFTER SURGERY    IT IS NOT UNUSUAL TO FEEL LIGHT-HEADED OR FAINT, UP TO 24 HOURS AFTER SURGERY OR WHILE TAKING PAIN MEDICATION.  IF YOU HAVE THESE SYMPTOMS; SIT FOR A FEW MINUTES BEFORE STANDING AND HAVE SOMEONE ASSIST YOU WHEN YOU GET UP TO WALK OR USE THE BATHROOM.    YOU SHOULD REST AND RELAX FOR THE NEXT 24 HOURS AND YOU MUST MAKE ARRANGEMENTS TO HAVE SOMEONE STAY WITH YOU FOR AT LEAST 24 HOURS AFTER YOUR DISCHARGE.  AVOID HAZARDOUS AND STRENUOUS ACTIVITIES.  DO NOT MAKE IMPORTANT DECISIONS FOR 24 HOURS.    DO NOT DRIVE ANY VEHICLE OR OPERATE MECHANICAL EQUIPMENT FOR 24 HOURS FOLLOWING THE END OF YOUR SURGERY.  EVEN THOUGH YOU MAY FEEL NORMAL, YOUR REACTIONS MAY BE AFFECTED BY THE MEDICATION YOU HAVE RECEIVED.    DO NOT DRINK ALCOHOLIC BEVERAGES FOR 24 HOURS FOLLOWING YOUR SURGERY.    DRINK CLEAR LIQUIDS (APPLE JUICE, GINGER ALE, 7-UP, BROTH, ETC.).  PROGRESS TO YOUR REGULAR DIET AS YOU FEEL ABLE.    YOU MAY HAVE A DRY MOUTH, A SORE THROAT, MUSCLES ACHES OR TROUBLE SLEEPING.  THESE SHOULD GO AWAY AFTER 24 HOURS.    CALL YOUR DOCTOR FOR ANY OF THE FOLLOWING:  SIGNS OF INFECTION (FEVER, GROWING TENDERNESS AT THE SURGERY SITE, A LARGE AMOUNT OF DRAINAGE OR BLEEDING, SEVERE  PAIN, FOUL-SMELLING DRAINAGE, REDNESS OR SWELLING.    IT HAS BEEN OVER 8 TO 10 HOURS SINCE SURGERY AND YOU ARE STILL NOT ABLE TO URINATE (PASS WATER).     DR. TY MACEDO M.D.   CLINIC PHONE NUMBER:  [phone].  LAPAROSCOPY, HYSTEROSCOPY OR PELVISCOPY DISCHARGE INSTRUCTIONS    PLEASE RETURN TO THE CLINIC IN:  ____1 WEEK  ____2 WEEKS    MAKE THIS APPOINTMENT AFTER YOU GET HOME IF IT HAS NOT ALREADY BEEN SCHEDULED.    DO NOT DRIVE A CAR, DRINK ALCOHOL OR USE MACHINERY FOR THE NEXT 24 HOURS.  YOU SHOULD WAIT UNTIL YOU HAVE RECOVERED BEFORE MAKING ANY IMPORTANT DECISIONS.    PAIN  YOU MAY HAVE CRAMPS, SHOULDER PAIN OR A LOW BACKACHE FOR 24 TO 48 HOURS.  TYLENOL (ACETAMINOPHEN) OR MOTRIN (IBUPROFEN) MAY HELP, OR YOUR DOCTOR MAY GIVE YOU PAIN MEDICINE.  CALL YOUR DOCTOR IF PAIN CANNOT BE CONTROLLED.    BLEEDING OR VAGINAL DISCHARGE  YOU MAY HAVE SOME BLEEDING OR DISCHARGE FOR UP TO A WEEK OR LONGER.  DO NOT DOUCHE, USE TAMPONS OR HAVE SEX (INTERCOURSE) FOR ______DAYS.  CALL YOUR DOCTOR IF YOU SOAK MORE THAN ONE MAXI PAD (SANITARY NAPKIN) PER HOUR, OR IF YOU PASS LARGE BLOOD CLOTS.    FEVER  YOU MAY HAVE A LOW FEVER FOR THE FIRST TWO DAYS.  CALL YOUR DOCTOR IF IT GOES OVER 101 DEGREES.    NAUSEA  IF YOU HAVE NAUSEA (FEEL SICK TO YOUR STOMACH), STAY IN BED.  TRY DRINKING A SMALL AMOUNT OF 7-UP, TEA OR SOUP.    SWOLLEN BELLY  IF YOUR ABDOMEN (BELLY AREA) FEELS FIRM OR SWOLLEN, CALL YOUR DOCTOR.    DIZZINESS AND WEAKNESS  YOU MAY FEEL DIZZY OR WEAK FOR A FEW DAYS.  IF SO, YOU SHOULD REST OFTEN, STAND UP SLOWLY AND USE CARE WHEN CLIMBING STAIRS.    DIET AND ACTIVITY  EAT LIGHT MEALS AND DRINK PLENTY OF FLUIDS FOR THE FIRST 24 HOURS (OR LONGER, IF YOU HAVE NAUSEA).  WAIT 5 DAYS BEFORE BATHING.  SHOWERS ARE OKAY.  MOST WOMEN CAN RETURN TO WORK AFTER 24 HOURS.  YOU MAY GO BACK TO YOUR OTHER ACTIVITIES AFTER YOUR PAIN GOES AWAY.            IF YOU HAVE STITCHES  YOU MAY REMOVE YOUR BANDAGE THE DAY AFTER TREATMENT.  YOUR  DOCTOR WILL TELL YOU IF YOUR STITCHES NEED TO BE REMOVED.  SOME STITCHES DISSOLVE OVER TIME.

## 2018-02-21 NOTE — ANESTHESIA PREPROCEDURE EVALUATION
Anesthesia Evaluation     . Pt has had prior anesthetic. Type: General    No history of anesthetic complications          ROS/MED HX    ENT/Pulmonary:  - neg pulmonary ROS     Neurologic:  - neg neurologic ROS     Cardiovascular:  - neg cardiovascular ROS       METS/Exercise Tolerance:     Hematologic: Comments: Lab Test        02/21/18     10/24/17     09/09/17                       0645          0829          1118          WBC           --          7.9          10.3          HGB          13.4         13.1         15.5          MCV           --          90           88            PLT           --          277          233            Lab Test        10/24/17     09/09/17                       0829          1118          NA           142          140           POTASSIUM    3.7          3.9           CHLORIDE     111*         106           CO2          22           26            BUN          12           9             CR           0.70         0.59          ANIONGAP     9            8             JHONY          8.2*         9.2           GLC          88           87             - neg hematologic  ROS       Musculoskeletal:  - neg musculoskeletal ROS       GI/Hepatic:  - neg GI/hepatic ROS       Renal/Genitourinary:  - ROS Renal section negative       Endo:  - neg endo ROS       Psychiatric:  - neg psychiatric ROS       Infectious Disease:  - neg infectious disease ROS       Malignancy:         Other:    - neg other ROS                 Physical Exam  Normal systems: cardiovascular, pulmonary and dental    Airway   Mallampati: II  TM distance: >3 FB  Neck ROM: full    Dental     Cardiovascular       Pulmonary                     Anesthesia Plan      History & Physical Review  History and physical reviewed and following examination; no interval change.    ASA Status:  2 .    NPO Status:  > 8 hours    Plan for General and ETT with Intravenous induction. Maintenance will be Balanced.    PONV prophylaxis:  Ondansetron (or  other 5HT-3) and Dexamethasone or Solumedrol       Postoperative Care  Postoperative pain management:  IV analgesics and Oral pain medications.      Consents  Anesthetic plan, risks, benefits and alternatives discussed with:  Patient..                          .

## 2018-02-21 NOTE — DISCHARGE SUMMARY
33 y/o  with pelvic pain and DESMOND II, s/p robotic assisted laparoscopic endometriosis resection, fulguration, chromotubation, Loop electroexcision procedure   No complications noted   EBL 10 cc  Dr. Ramila Pak, DO    Obstetrics and Gynecology  [facility] [place] [facility] [place]

## 2018-02-22 LAB — COPATH REPORT: NORMAL

## 2018-02-27 ENCOUNTER — OFFICE VISIT (OUTPATIENT)
Dept: OBGYN | Facility: CLINIC | Age: 33
End: 2018-02-27
Payer: COMMERCIAL

## 2018-02-27 VITALS
HEIGHT: 65 IN | SYSTOLIC BLOOD PRESSURE: 110 MMHG | BODY MASS INDEX: 35.94 KG/M2 | TEMPERATURE: 98.6 F | DIASTOLIC BLOOD PRESSURE: 60 MMHG | WEIGHT: 215.7 LBS

## 2018-02-27 DIAGNOSIS — Z98.890 S/P LAPAROSCOPY: ICD-10-CM

## 2018-02-27 DIAGNOSIS — Z30.011 ENCOUNTER FOR INITIAL PRESCRIPTION OF CONTRACEPTIVE PILLS: Primary | ICD-10-CM

## 2018-02-27 PROBLEM — R87.810 CERVICAL HIGH RISK HPV (HUMAN PAPILLOMAVIRUS) TEST POSITIVE: Status: ACTIVE | Noted: 2017-10-04

## 2018-02-27 PROBLEM — N87.1 CIN II (CERVICAL INTRAEPITHELIAL NEOPLASIA II): Status: ACTIVE | Noted: 2017-10-04

## 2018-02-27 PROCEDURE — 99212 OFFICE O/P EST SF 10 MIN: CPT | Mod: 24 | Performed by: FAMILY MEDICINE

## 2018-02-27 PROCEDURE — 99024 POSTOP FOLLOW-UP VISIT: CPT | Performed by: FAMILY MEDICINE

## 2018-02-27 RX ORDER — LEVONORGESTREL / ETHINYL ESTRADIOL AND ETHINYL ESTRADIOL 150-30(84)
1 KIT ORAL DAILY
Qty: 91 TABLET | Refills: 3 | Status: SHIPPED | OUTPATIENT
Start: 2018-02-27 | End: 2019-06-28

## 2018-02-27 NOTE — MR AVS SNAPSHOT
After Visit Summary   2/27/2018    Tiffanie Tate    MRN: [id number]           Patient Information     Date Of Birth          1985        Visit Information        Provider Department      2/27/2018 10:45 AM Ramila Pak, DO [facility]        Today's Diagnoses     Encounter for initial prescription of contraceptive pills    -  1    S/P laparoscopy          Care Instructions    Return in 6 months for pap and endometriosis check     Start Oral Contraceptive     Consider muscle relaxers and nerve medications if pain not better     Dr. Ramila Pak, DO    Obstetrics and Gynecology  [facility] [place]                 Follow-ups after your visit        Your next 10 appointments already scheduled     Mar 07, 2018  8:40 AM CST   New Visit with Andres Smith, DO   [facility])    [address] [address]  [address] [place] [address]   [phone]              Who to contact     If you have questions or need follow up information about today's clinic visit or your schedule please contact [facility] directly at [phone].  Normal or non-critical lab and imaging results will be communicated to you by MyChart, letter or phone within 4 business days after the clinic has received the results. If you do not hear from us within 7 days, please contact the clinic through MyChart or phone. If you have a critical or abnormal lab result, we will notify you by phone as soon as possible.  Submit refill requests through [employer] or call your pharmacy and they will forward the refill request to us. Please allow 3 business days for your refill to be completed.          Additional Information About Your Visit        MyChart Information     [employer] gives you secure access to your electronic health record. If you see a primary care provider, you can also send messages to your care team and  "make appointments. If you have questions, please call your primary care clinic.  If you do not have a primary care provider, please call [phone] and they will assist you.        Care EveryWhere ID     This is your Care EveryWhere ID. This could be used by other organizations to access your [place] medical records  [id number]        Your Vitals Were     Temperature Height Last Period BMI (Body Mass Index)          98.6  F (37  C) (Oral) 5' 5\" (1.651 m) 02/15/2018 35.89 kg/m2         Blood Pressure from Last 3 Encounters:   02/27/18 110/60   02/21/18 123/86   02/15/18 110/60    Weight from Last 3 Encounters:   02/27/18 215 lb 11.2 oz (97.8 kg)   02/21/18 214 lb (97.1 kg)   02/15/18 213 lb 8 oz (96.8 kg)              Today, you had the following     No orders found for display         Today's Medication Changes          These changes are accurate as of 2/27/18 10:54 AM.  If you have any questions, ask your nurse or doctor.               Start taking these medicines.        Dose/Directions    levonorgest-eth estrad 91-Day 0.15-0.03 &0.01 MG per tablet   Commonly known as:  SEASONIQUE   Used for:  Encounter for initial prescription of contraceptive pills   Started by:  Ramila Pak DO        Dose:  1 tablet   Take 1 tablet by mouth daily   Quantity:  91 tablet   Refills:  3            Where to get your medicines      These medications were sent to [facility] [address] [place] [address] [address] [facility] [address], [facility] [address]    Hours:  24-hours Phone:  [phone]     levonorgest-eth estrad 91-Day 0.15-0.03 &0.01 MG per tablet                Primary Care Provider Office Phone # Fax #    Ruba Perez [phone] [address] [place] [address]        Equal Access to Services     [facility] NICOLE AH: Nafisa Wagner, marsha alejandre, qaybta grant marino. " So [facility] [phone].    ATENCIÓN: Si carina smith, tiene a iyer disposición servicios gratuitos de asistencia lingüística. Javier hernandez [phone].    We comply with applicable federal civil rights laws and [place] laws. We do not discriminate on the basis of race, color, national origin, age, disability, sex, sexual orientation, or gender identity.            Thank you!     Thank you for choosing [facility]  for your care. Our goal is always to provide you with excellent care. Hearing back from our patients is one way we can continue to improve our services. Please take a few minutes to complete the written survey that you may receive in the mail after your visit with us. Thank you!             Your Updated Medication List - Protect others around you: Learn how to safely use, store and throw away your medicines at www.disposemymeds.org.          This list is accurate as of 2/27/18 10:54 AM.  Always use your most recent med list.                   Brand Name Dispense Instructions for use Diagnosis    cetirizine 10 MG tablet    zyrTEC     Take 10 mg by mouth daily        cyclobenzaprine 5 MG tablet    FLEXERIL    42 tablet    Take 1 tablet (5 mg) by mouth 3 times daily as needed for muscle spasms    Pelvic pain in female       docusate sodium 100 MG tablet    COLACE    60 tablet    Take 100 mg by mouth daily    S/P laparoscopy       ibuprofen 800 MG tablet    ADVIL/MOTRIN    90 tablet    Take 1 tablet (800 mg) by mouth every 8 hours as needed for moderate pain    S/P laparoscopy       levonorgest-eth estrad 91-Day 0.15-0.03 &0.01 MG per tablet    SEASONIQUE    91 tablet    Take 1 tablet by mouth daily    Encounter for initial prescription of contraceptive pills       oxyCODONE IR 5 MG tablet    ROXICODONE    18 tablet    Take 1 tablet (5 mg) by mouth every 4 hours as needed for pain maximum 6 tablet(s) per day    S/P laparoscopy       TYLENOL 500 MG tablet   Generic drug:  acetaminophen      Take  1,000 mg by mouth every 6 hours as needed for mild pain

## 2018-02-27 NOTE — PROGRESS NOTES
"Subjective: 32 year old female   status post robotic assisted laparoscopic endometriosis resection, fulguration, chromotubation, Loop electroexcision procedure on 2/21/18, here for incision check.  Doing well, denies fever, significant pain. Is taking ibuprofen. States some light vaginal bleeding. Desires OCP for contraception.    This document serves as a record of the services and decisions personally performed and made by Ramila Pak DO. It was created on his/her behalf by Cathy Martin, a trained medical scribe. The creation of this document is based the provider's statements to the medical scribe.  Louie Martin 10:49 AM, February 27, 2018    Objective:  EXAM:  /60  Temp 98.6  F (37  C) (Oral)  Ht 1.651 m (5' 5\")  Wt 97.8 kg (215 lb 11.2 oz)  LMP 02/15/2018  BMI 35.89 kg/m2  Constitutional: healthy, alert and no distress  Gastrointestinal: Abdomen soft, non-tender. Incision intact, no erthema, drainage.       Assessment/Plan: 32 year old female   status post robotic assisted laparoscopic endometriosis resection, fulguration, chromotubation, Loop electroexcision procedure on 2/21/18.  V67.00C Surgery Follow-Up Examination  (primary encounter diagnosis)  Comment:    Plan:   1) Incisions healing well: Patient instructed to call if incisions develop puss  2) DESMOND II: Pap in 6 months.   3) Endometriosis: start seasonale to suppress endometriosis   Will consider muscle relaxers and nerve medications if pain is not improving  4) Contraception: Rx Seasonale   5) Return to clinic in 6 months    The information in this document, created by the medical scribe for me, accurately reflects the services I personally performed and the decisions made by me. I have reviewed and approved this document for accuracy prior to leaving the patient care area.  Ramila Pak DO  10:59 AM, 02/27/18      "

## 2018-02-27 NOTE — NURSING NOTE
"Chief Complaint   Patient presents with     Surgical Followup     surgery 2/21/18--feels like muscles are sore--incision tender to the touch       Initial /60  Temp 98.6  F (37  C) (Oral)  Ht 5' 5\" (1.651 m)  Wt 215 lb 11.2 oz (97.8 kg)  LMP 02/15/2018  BMI 35.89 kg/m2 Estimated body mass index is 35.89 kg/(m^2) as calculated from the following:    Height as of this encounter: 5' 5\" (1.651 m).    Weight as of this encounter: 215 lb 11.2 oz (97.8 kg).  Medication Reconciliation: complete   Ashley Jang CMA      "

## 2018-03-07 ENCOUNTER — RADIANT APPOINTMENT (OUTPATIENT)
Dept: GENERAL RADIOLOGY | Facility: CLINIC | Age: 33
End: 2018-03-07
Attending: FAMILY MEDICINE
Payer: COMMERCIAL

## 2018-03-07 ENCOUNTER — OFFICE VISIT (OUTPATIENT)
Dept: ORTHOPEDICS | Facility: CLINIC | Age: 33
End: 2018-03-07
Payer: COMMERCIAL

## 2018-03-07 VITALS
HEIGHT: 65 IN | DIASTOLIC BLOOD PRESSURE: 65 MMHG | SYSTOLIC BLOOD PRESSURE: 112 MMHG | WEIGHT: 213 LBS | BODY MASS INDEX: 35.49 KG/M2

## 2018-03-07 DIAGNOSIS — M25.562 ARTHRALGIA OF BOTH LOWER LEGS: ICD-10-CM

## 2018-03-07 DIAGNOSIS — M22.2X1 PATELLOFEMORAL PAIN SYNDROME OF BOTH KNEES: ICD-10-CM

## 2018-03-07 DIAGNOSIS — M22.2X2 PATELLOFEMORAL PAIN SYNDROME OF BOTH KNEES: ICD-10-CM

## 2018-03-07 DIAGNOSIS — M25.561 ARTHRALGIA OF BOTH LOWER LEGS: ICD-10-CM

## 2018-03-07 DIAGNOSIS — M25.562 ARTHRALGIA OF BOTH LOWER LEGS: Primary | ICD-10-CM

## 2018-03-07 DIAGNOSIS — M25.561 ARTHRALGIA OF BOTH LOWER LEGS: Primary | ICD-10-CM

## 2018-03-07 DIAGNOSIS — R29.898 WEAKNESS OF BOTH HIPS: ICD-10-CM

## 2018-03-07 PROCEDURE — 73562 X-RAY EXAM OF KNEE 3: CPT | Mod: RT

## 2018-03-07 PROCEDURE — 99243 OFF/OP CNSLTJ NEW/EST LOW 30: CPT | Performed by: FAMILY MEDICINE

## 2018-03-07 NOTE — PROGRESS NOTES
"ASSESSMENT & PLAN    1. Arthralgia of both lower legs    2. Patellofemoral pain syndrome of both knees    3. Weakness of both hips      Activity modification as discussed - stairs squats, lunges will bother/aggravate your knees  No limitations with exercise, respect 6/10 pain  Physical therapy: [facility] - [phone]  Can take up to 8 weeks to change your strength/mechanics    Follow up after 8 PT sessions if not improved  or sooner if needed. Call direct clinic number [[phone]] at any time with questions or concerns.  -----    SUBJECTIVE  Tiffanie Tate is a/an 32 year old female who is seen in consultation at the request of  Ximena Sweet M.D. for evaluation of bilateral knee pain. The patient is seen by themselves.    Onset: 5 years(s) ago. Reports insidious onset without acute precipitating event.  Location of Pain: bilateral anterior knee- distal quad  Rating of Pain at worst: 8/10  Rating of Pain Currently: 4/10  Worsened by: sitting cross legged on the floor, sleeping with knees bent   Better with: heat  Treatments tried: Icy Hot  Associated symptoms: denies any locking/catching, swelling/bruising  Orthopedic history: was seen in Urgent Care September 2017 for multiple joint pains- had elevated inflammatory markers- symptoms resolved in a couple weeks and haven't returned  Relevant surgical history: NO  Patient Social History: works in market research    Patient's past medical, surgical, social, and family histories were reviewed today and no changes are noted.    REVIEW OF SYSTEMS:  10 point ROS is negative other than symptoms noted above in HPI, Past Medical History or as stated below  Constitutional: NEGATIVE for fever, chills, change in weight  Skin: NEGATIVE for worrisome rashes, moles or lesions  GI/: NEGATIVE for bowel or bladder changes  Neuro: NEGATIVE for weakness, dizziness or paresthesias    OBJECTIVE:  /65  Ht 5' 5\" (1.651 m)  Wt 213 lb (96.6 " kg)  LMP 02/15/2018  BMI 35.45 kg/m2   General: healthy, alert and in no distress  HEENT: no scleral icterus or conjunctival erythema  Skin: no suspicious lesions or rash. No jaundice.  CV: no pedal edema  Resp: normal respiratory effort without conversational dyspnea   Psych: normal mood and affect  Gait: normal steady gait with appropriate coordination and balance  Neuro: Normal light sensory exam of lower extremity  MSK:  BILATERAL KNEE  Inspection:    normal alignment, increased knee valgus with one-legged squat, pes planus (left)  Palpation:    Tender about the lateral patellar facet and medial patellar facet. Remainder of bony and ligamentous landmarks are nontender.    No effusion is present    Patellofemoral crepitus is Present  Range of Motion:     00 extension to 1350 flexion  Strength:    Quadriceps weakness, gluteus weakness    Extensor mechanism intact  Special Tests:    Negative: Patellar grind, MCL/valgus stress (0 & 30 deg), LCL/varus stress (0 & 30 deg), Lachman's, posterior drawer, Javad's    Independent visualization of the below image:  Recent Results (from the past 24 hour(s))   XR Knee Bilateral 3 Views    Narrative    KNEE BILATERAL THREE VIEWS  3/7/2018 9:15 AM     HISTORY:  Arthralgia of both lower legs.     COMPARISON: None.      Impression    IMPRESSION:     Right knee: No acute bony abnormality, significant degenerative change  or joint space effusion.    Left knee: No acute bony abnormality, significant degenerative change  or joint space effusion.     Patient's conditions were thoroughly discussed during today's visit with greater than 50% of the visit spent counseling the patient with total time spent face-to-face with the patient being 25 minutes.    Andres Smith DO [facility]

## 2018-03-07 NOTE — LETTER
3/7/2018         RE: Tiffanie Tate  [address]  [place] [address]        Dear Colleague,    Thank you for referring your patient, Tiffanie Tate, to the [facility]. Please see a copy of my visit note below.    ASSESSMENT & PLAN    1. Arthralgia of both lower legs    2. Patellofemoral pain syndrome of both knees    3. Weakness of both hips      Activity modification as discussed - stairs squats, lunges will bother/aggravate your knees  No limitations with exercise, respect 6/10 pain  Physical therapy: [facility] - [phone]  Can take up to 8 weeks to change your strength/mechanics    Follow up after 8 PT sessions if not improved  or sooner if needed. Call direct clinic number [[phone]] at any time with questions or concerns.  -----    SUBJECTIVE  Tiffanie Tate is a/an 32 year old female who is seen in consultation at the request of  Ximena Sweet M.D. for evaluation of bilateral knee pain. The patient is seen by themselves.    Onset: 5 years(s) ago. Reports insidious onset without acute precipitating event.  Location of Pain: bilateral anterior knee- distal quad  Rating of Pain at worst: 8/10  Rating of Pain Currently: 4/10  Worsened by: sitting cross legged on the floor, sleeping with knees bent   Better with: heat  Treatments tried: Icy Hot  Associated symptoms: denies any locking/catching, swelling/bruising  Orthopedic history: was seen in Urgent Care September 2017 for multiple joint pains- had elevated inflammatory markers- symptoms resolved in a couple weeks and haven't returned  Relevant surgical history: NO  Patient Social History: works in market research    Patient's past medical, surgical, social, and family histories were reviewed today and no changes are noted.    REVIEW OF SYSTEMS:  10 point ROS is negative other than symptoms noted above in HPI, Past Medical History or as stated  "below  Constitutional: NEGATIVE for fever, chills, change in weight  Skin: NEGATIVE for worrisome rashes, moles or lesions  GI/: NEGATIVE for bowel or bladder changes  Neuro: NEGATIVE for weakness, dizziness or paresthesias    OBJECTIVE:  /65  Ht 5' 5\" (1.651 m)  Wt 213 lb (96.6 kg)  LMP 02/15/2018  BMI 35.45 kg/m2   General: healthy, alert and in no distress  HEENT: no scleral icterus or conjunctival erythema  Skin: no suspicious lesions or rash. No jaundice.  CV: no pedal edema  Resp: normal respiratory effort without conversational dyspnea   Psych: normal mood and affect  Gait: normal steady gait with appropriate coordination and balance  Neuro: Normal light sensory exam of lower extremity  MSK:  BILATERAL KNEE  Inspection:    normal alignment, increased knee valgus with one-legged squat, pes planus (left)  Palpation:    Tender about the lateral patellar facet and medial patellar facet. Remainder of bony and ligamentous landmarks are nontender.    No effusion is present    Patellofemoral crepitus is Present  Range of Motion:     00 extension to 1350 flexion  Strength:    Quadriceps weakness, gluteus weakness    Extensor mechanism intact  Special Tests:    Negative: Patellar grind, MCL/valgus stress (0 & 30 deg), LCL/varus stress (0 & 30 deg), Lachman's, posterior drawer, Javad's    Independent visualization of the below image:  Recent Results (from the past 24 hour(s))   XR Knee Bilateral 3 Views    Narrative    KNEE BILATERAL THREE VIEWS  3/7/2018 9:15 AM     HISTORY:  Arthralgia of both lower legs.     COMPARISON: None.      Impression    IMPRESSION:     Right knee: No acute bony abnormality, significant degenerative change  or joint space effusion.    Left knee: No acute bony abnormality, significant degenerative change  or joint space effusion.     Patient's conditions were thoroughly discussed during today's visit with greater than 50% of the visit spent counseling the patient with total time " spent face-to-face with the patient being 25 minutes.    Andres Smith DO [facility]      Again, thank you for allowing me to participate in the care of your patient.        Sincerely,        Andres Smith, DO

## 2018-03-07 NOTE — PATIENT INSTRUCTIONS
1. Arthralgia of both lower legs    2. Patellofemoral pain syndrome of both knees    3. Weakness of both hips      Activity modification as discussed - stairs squats, lunges will bother/aggravate your knees  No limitations with exercise, respect 6/10 pain  Physical therapy: [facility] - [phone]  Can take up to 8 weeks to change your strength/mechanics    Follow up after 8 PT sessions if not improved  or sooner if needed. Call direct clinic number [[phone]] at any time with questions or concerns.      NEW WEBSITE HAS LAUNCHED  [email or web address]    We care about your feedback and use it to improve our services. Please leave feedback on the Testamonials & Reviews page.

## 2018-03-07 NOTE — MR AVS SNAPSHOT
After Visit Summary   3/7/2018    Tiffanie Tate    MRN: [id number]           Patient Information     Date Of Birth          1985        Visit Information        Provider Department      3/7/2018 8:40 AM Andres Smith, DO [facility]        Today's Diagnoses     Arthralgia of both lower legs    -  1    Patellofemoral pain syndrome of both knees        Weakness of both hips          Care Instructions    1. Arthralgia of both lower legs    2. Patellofemoral pain syndrome of both knees    3. Weakness of both hips      Activity modification as discussed - stairs squats, lunges will bother/aggravate your knees  No limitations with exercise, respect 6/10 pain  Physical therapy: [facility] - [phone]  Can take up to 8 weeks to change your strength/mechanics    Follow up after 8 PT sessions if not improved  or sooner if needed. Call direct clinic number [[phone]] at any time with questions or concerns.      NEW WEBSITE HAS LAUNCHED  [email or web address]    We care about your feedback and use it to improve our services. Please leave feedback on the Testamonials & Reviews page.              Follow-ups after your visit        Additional Services     [facility] PT, HAND, AND CHIROPRACTIC REFERRAL       **This order will print in the [facility] Scheduling Office**    Physical Therapy, Hand Therapy and Chiropractic Care are available through:    *[facility]  *[facility]  *[facility]    Call one number to schedule at any of the above locations: [phone].    Your provider has referred you to: Physical Therapy at [facility] or [facility]    Indication/Reason for Referral: B/L PFPS/Chondromalacia patella - b/l hip/gluteus weakness  Onset of Illness: see chart  Therapy Orders: Evaluate and Treat  Special Programs: None  Special Request: kinesioptaping. If [place]: Chary Ochoa Heather or Roni Platt     Lc      Additional Comments for the Therapist or Chiropractor: Formal physical therapy - exercises to include neuromuscular/proprioceptive control and VMO/gluteus/adductor strengthening. Please also include pain-free closed chain/isometric/isotonic strengthening with use of modalities (including kinesioptaping) as needed/deemed appropriate with home exercise prescription.      Please be aware that coverage of these services is subject to the terms and limitations of your health insurance plan.  Call member services at your health plan with any benefit or coverage questions.      Please bring the following to your appointment:    *Your personal calendar for scheduling future appointments  *Comfortable clothing                  Your next 10 appointments already scheduled     Aug 30, 2018  8:30 AM CDT   [facility] Ramila Pak,    [facility]    [address]  [facility] [address] [phone]           Bring a current list of meds and any records pertaining to this visit. For Physicals, please bring immunization records and any forms needing to be filled out. Please arrive 10 minutes early to complete paperwork.              Who to contact     If you have questions or need follow up information about today's clinic visit or your schedule please contact [facility]IC[facility] directly at [phone].  Normal or non-critical lab and imaging results will be communicated to you by MyChart, letter or phone within 4 business days after the clinic has received the results. If you do not hear from us within 7 days, please contact the clinic through MyChart or phone. If you have a critical or abnormal lab result, we will notify you by phone as soon as possible.  Submit refill requests through [employer] or call your pharmacy and they will forward the refill request to us. Please allow 3 business days for your refill to be completed.        "   Additional Information About Your Visit        MyChart Information     [employer] gives you secure access to your electronic health record. If you see a primary care provider, you can also send messages to your care team and make appointments. If you have questions, please call your primary care clinic.  If you do not have a primary care provider, please call [phone] and they will assist you.        Care EveryWhere ID     This is your Care EveryWhere ID. This could be used by other organizations to access your [place] medical records  [id number]        Your Vitals Were     Height Last Period BMI (Body Mass Index)             5' 5\" (1.651 m) 02/15/2018 35.45 kg/m2          Blood Pressure from Last 3 Encounters:   03/07/18 112/65   02/27/18 110/60   02/21/18 123/86    Weight from Last 3 Encounters:   03/07/18 213 lb (96.6 kg)   02/27/18 215 lb 11.2 oz (97.8 kg)   02/21/18 214 lb (97.1 kg)              We Performed the Following     [facility], HAND, AND CHIROPRACTIC REFERRAL        Primary Care Provider Office Phone # Fax #    Ruba Perez [phone] [address] [place] [address]        Equal Access to Services     [facility] Hospital[facility] : Hadii aad ku hadasho Soomaali, waaxda luqadaha, qaybta kaalmada adeegyada, waxay curtisin haydulcen jenny castellon lamariposa . So [facility] [phone].    ATENCIÓN: Si habla español, tiene a iyer disposición servicios gratuitos de asistencia lingüística. Llmario al [phone].    We comply with applicable federal civil rights laws and [place] laws. We do not discriminate on the basis of race, color, national origin, age, disability, sex, sexual orientation, or gender identity.            Thank you!     Thank you for choosing [facility]  for your care. Our goal is always to provide you with excellent care. Hearing back from our patients is one way we can continue to improve our services. Please take a few minutes to complete the written " survey that you may receive in the mail after your visit with us. Thank you!             Your Updated Medication List - Protect others around you: Learn how to safely use, store and throw away your medicines at www.disposemymeds.org.          This list is accurate as of 3/7/18  9:52 AM.  Always use your most recent med list.                   Brand Name Dispense Instructions for use Diagnosis    cetirizine 10 MG tablet    zyrTEC     Take 10 mg by mouth daily        cyclobenzaprine 5 MG tablet    FLEXERIL    42 tablet    Take 1 tablet (5 mg) by mouth 3 times daily as needed for muscle spasms    Pelvic pain in female       docusate sodium 100 MG tablet    COLACE    60 tablet    Take 100 mg by mouth daily    S/P laparoscopy       ibuprofen 800 MG tablet    ADVIL/MOTRIN    90 tablet    Take 1 tablet (800 mg) by mouth every 8 hours as needed for moderate pain    S/P laparoscopy       levonorgest-eth estrad 91-Day 0.15-0.03 &0.01 MG per tablet    SEASONIQUE    91 tablet    Take 1 tablet by mouth daily    Encounter for initial prescription of contraceptive pills       oxyCODONE IR 5 MG tablet    ROXICODONE    18 tablet    Take 1 tablet (5 mg) by mouth every 4 hours as needed for pain maximum 6 tablet(s) per day    S/P laparoscopy       TYLENOL 500 MG tablet   Generic drug:  acetaminophen      Take 1,000 mg by mouth every 6 hours as needed for mild pain

## 2018-03-22 ENCOUNTER — THERAPY VISIT (OUTPATIENT)
Dept: PHYSICAL THERAPY | Facility: CLINIC | Age: 33
End: 2018-03-22
Payer: COMMERCIAL

## 2018-03-22 DIAGNOSIS — M25.562 BILATERAL KNEE PAIN: Primary | ICD-10-CM

## 2018-03-22 DIAGNOSIS — M25.561 BILATERAL KNEE PAIN: Primary | ICD-10-CM

## 2018-03-22 PROCEDURE — G8979 MOBILITY GOAL STATUS: HCPCS | Mod: GP | Performed by: PHYSICAL THERAPIST

## 2018-03-22 PROCEDURE — 97110 THERAPEUTIC EXERCISES: CPT | Mod: GP | Performed by: PHYSICAL THERAPIST

## 2018-03-22 PROCEDURE — G8978 MOBILITY CURRENT STATUS: HCPCS | Mod: GP | Performed by: PHYSICAL THERAPIST

## 2018-03-22 PROCEDURE — 97161 PT EVAL LOW COMPLEX 20 MIN: CPT | Mod: GP | Performed by: PHYSICAL THERAPIST

## 2018-03-22 ASSESSMENT — ACTIVITIES OF DAILY LIVING (ADL)
WALK: ACTIVITY IS NOT DIFFICULT
RISE FROM A CHAIR: ACTIVITY IS NOT DIFFICULT
GO UP STAIRS: ACTIVITY IS NOT DIFFICULT
AS_A_RESULT_OF_YOUR_KNEE_INJURY,_HOW_WOULD_YOU_RATE_YOUR_CURRENT_LEVEL_OF_DAILY_ACTIVITY?: NEARLY NORMAL
HOW_WOULD_YOU_RATE_THE_CURRENT_FUNCTION_OF_YOUR_KNEE_DURING_YOUR_USUAL_DAILY_ACTIVITIES_ON_A_SCALE_FROM_0_TO_100_WITH_100_BEING_YOUR_LEVEL_OF_KNEE_FUNCTION_PRIOR_TO_YOUR_INJURY_AND_0_BEING_THE_INABILITY_TO_PERFORM_ANY_OF_YOUR_USUAL_DAILY_ACTIVITIES?: 95
STAND: ACTIVITY IS NOT DIFFICULT
RAW_SCORE: 68
GO DOWN STAIRS: ACTIVITY IS NOT DIFFICULT
HOW_WOULD_YOU_RATE_THE_OVERALL_FUNCTION_OF_YOUR_KNEE_DURING_YOUR_USUAL_DAILY_ACTIVITIES?: NEARLY NORMAL
KNEEL ON THE FRONT OF YOUR KNEE: ACTIVITY IS NOT DIFFICULT
PAIN: THE SYMPTOM AFFECTS MY ACTIVITY SLIGHTLY
LIMPING: I DO NOT HAVE THE SYMPTOM
STIFFNESS: I DO NOT HAVE THE SYMPTOM
SIT WITH YOUR KNEE BENT: ACTIVITY IS NOT DIFFICULT
KNEE_ACTIVITY_OF_DAILY_LIVING_SUM: 68
GIVING WAY, BUCKLING OR SHIFTING OF KNEE: I DO NOT HAVE THE SYMPTOM
SWELLING: I DO NOT HAVE THE SYMPTOM
SQUAT: ACTIVITY IS NOT DIFFICULT
KNEE_ACTIVITY_OF_DAILY_LIVING_SCORE: 97.14
WEAKNESS: I DO NOT HAVE THE SYMPTOM

## 2018-03-22 NOTE — PROGRESS NOTES
[facility] Initial Evaluation  Subjective:  HPI                    Objective:  System    Physical Exam    [facility]: Physical Therapy Initial Evaluation   Mar 22, 2018  Subjective:   Chief Complaint:    Pain: anterior knee pain superior and lateral to the knee caps. Usually start in the left knee and moves to the right knee.    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  Left knee: No acute bony abnormality, significant degenerative change or joint space effusion.  AM/PM: same all day  Quality of Pain: aching  Pain: 0/10 at present, 0/10 at best, 7/10 at worst  Better: Just don't aggravate it  Worse: sitting crossed legged, positions with the knee bent, stairs, driving  Progression of Symptoms since onset: same   Sleeping: Losses about two hours when painful   Other current functional challenges: stairs, sitting, getting up from a chair, kneeling,  Current Functional Status: stairs - one step at a time, use of the handrail when painful ; getting up from a chair - heavy use of arms ; sitting - can sit for about 20 minutes ;  Kneeling - cannot kneel when painful    Current HEP/exercise regimen: belly dancing once per week, has a small gym in her apartment,  "  Transportation: Independent with transportation  Red Flags:   - Patient denies the following: Locking, Catching (knee); Fever ; Weakness ; Numbness/Tingling ;     Patient's Goal(s): Improve muscle mechanics to properly move and hopefully reduce the symptoms.       Objective:    Standing Posture: normal standing posture    Gait: bilateral hip drop, bilateral increase in ankle pronation with walking    Functional:   - Lateral Step Down: Poor control with step taller than 3\"        AROM: (* indicates patient's pain)   PROM:(over pressure)   R  L R L   Hyperextension 5 10     Extension 0 0     Flexion 130 129       Strength:   R L   HIP     Flex 5 5   Abd 4 4   Ext 4+ 4+   KNEE     Ext 5 5   Flex 5 5     Hip PROM:  WNL    Special tests:   R L   Patellar Compression (-) (-)     Patellar tracking, positioning, mobility: Lateral shift, lateral tilt of both patellae    Assessment/Plan:    Patient is a 32 year old female with both sides knee complaints.    Patient has the following significant findings with corresponding treatment plan.                Diagnosis 1:  Bilateral Patellofemoral Pain Syndrome  Pain -  hot/cold therapy, manual therapy, splint/taping/bracing/orthotics, self management, education and home program  Decreased strength - therapeutic exercise, therapeutic activities and home program  Impaired gait - gait training and home program  Impaired muscle performance - neuro re-education and home program  Decreased function - therapeutic activities and home program    Therapy Evaluation Codes:   1) History comprised of:   Personal factors that impact the plan of care:      None.    Comorbidity factors that impact the plan of care are:      None.     Medications impacting care: None.  2) Examination of Body Systems comprised of:   Body structures and functions that impact the plan of care:      Hip and Knee.   Activity limitations that impact the plan of care are:      Sitting, Squatting/kneeling, Stairs and " Standing.  3) Clinical presentation characteristics are:   Stable/Uncomplicated.  4) Decision-Making    Low complexity using standardized patient assessment instrument and/or measureable assessment of functional outcome.  Cumulative Therapy Evaluation is: Low complexity.    Previous and current functional limitations:  (See Goal Flow Sheet for this information)    Short term and Long term goals: (See Goal Flow Sheet for this information)     Communication ability:  Patient appears to be able to clearly communicate and understand verbal and written communication and follow directions correctly.  Treatment Explanation - The following has been discussed with the patient:   RX ordered/plan of care  Anticipated outcomes  Possible risks and side effects  This patient would benefit from PT intervention to resume normal activities.   Rehab potential is good.    Frequency:  1 X week, once daily  Duration:  for 4 weeks tapering to 2 X a month over 8 weeks  Discharge Plan:  Achieve all LTG.  Independent in home treatment program.  Reach maximal therapeutic benefit.    Please refer to the daily flowsheet for treatment today, total treatment time and time spent performing 1:1 timed codes.

## 2018-03-29 ENCOUNTER — THERAPY VISIT (OUTPATIENT)
Dept: PHYSICAL THERAPY | Facility: CLINIC | Age: 33
End: 2018-03-29
Payer: COMMERCIAL

## 2018-03-29 DIAGNOSIS — M25.562 BILATERAL KNEE PAIN: ICD-10-CM

## 2018-03-29 DIAGNOSIS — M25.561 BILATERAL KNEE PAIN: ICD-10-CM

## 2018-03-29 PROCEDURE — 97140 MANUAL THERAPY 1/> REGIONS: CPT | Mod: GP | Performed by: PHYSICAL THERAPIST

## 2018-03-29 PROCEDURE — 97112 NEUROMUSCULAR REEDUCATION: CPT | Mod: GP | Performed by: PHYSICAL THERAPIST

## 2018-03-29 PROCEDURE — 97110 THERAPEUTIC EXERCISES: CPT | Mod: GP | Performed by: PHYSICAL THERAPIST

## 2018-04-05 ENCOUNTER — THERAPY VISIT (OUTPATIENT)
Dept: PHYSICAL THERAPY | Facility: CLINIC | Age: 33
End: 2018-04-05
Payer: COMMERCIAL

## 2018-04-05 DIAGNOSIS — M25.561 BILATERAL KNEE PAIN: ICD-10-CM

## 2018-04-05 DIAGNOSIS — M25.562 BILATERAL KNEE PAIN: ICD-10-CM

## 2018-04-05 PROCEDURE — 97110 THERAPEUTIC EXERCISES: CPT | Mod: GP | Performed by: PHYSICAL THERAPIST

## 2018-04-05 PROCEDURE — 97530 THERAPEUTIC ACTIVITIES: CPT | Mod: GP | Performed by: PHYSICAL THERAPIST

## 2018-04-18 ENCOUNTER — THERAPY VISIT (OUTPATIENT)
Dept: PHYSICAL THERAPY | Facility: CLINIC | Age: 33
End: 2018-04-18
Payer: COMMERCIAL

## 2018-04-18 DIAGNOSIS — M25.561 BILATERAL KNEE PAIN: ICD-10-CM

## 2018-04-18 DIAGNOSIS — M25.562 BILATERAL KNEE PAIN: ICD-10-CM

## 2018-04-18 PROCEDURE — 97140 MANUAL THERAPY 1/> REGIONS: CPT | Mod: GP | Performed by: PHYSICAL THERAPIST

## 2018-04-18 PROCEDURE — 97110 THERAPEUTIC EXERCISES: CPT | Mod: GP | Performed by: PHYSICAL THERAPIST

## 2018-05-08 NOTE — LETTER
[address] [address]                  [phone]   September 21, 2017    Tiffanie Tate  [address]  [place] [address]      Dear Tiffanie,    Please find your lab results enclosed. All of your lab results look normal, with the exception of your CRP (an inflammatory marker) which is going down from where it was previously. Hopefully you will continue to see improvement in your symptoms, but if not, please don't hesitate to follow-up with a Rheumatologist. Please let me know if you have any concerns or questions.    Sincerely,    Ruba Perez MD  Internal Medicine-Pediatrics      Results for orders placed or performed in visit on 09/19/17   CRP, inflammation   Result Value Ref Range    CRP Inflammation 15.0 (H) 0.0 - 8.0 mg/L   Erythrocyte sedimentation rate auto   Result Value Ref Range    Sed Rate 10 0 - 20 mm/h   Parvovirus B19 antibodies IgG IgM   Result Value Ref Range    Parvovirus B19 IgG 5.26 (H) <=0.89 IV    Parvovirus B19 IgM 0.11 <=0.89 IV   Neisseria gonorrhoeae PCR   Result Value Ref Range    Specimen Descrip Urine     N Gonorrhea PCR Negative NEG^Negative   Chlamydia trachomatis PCR   Result Value Ref Range    Specimen Description Urine     Chlamydia Trachomatis PCR Negative NEG^Negative     
CPA

## 2018-06-06 ENCOUNTER — THERAPY VISIT (OUTPATIENT)
Dept: PHYSICAL THERAPY | Facility: CLINIC | Age: 33
End: 2018-06-06
Payer: COMMERCIAL

## 2018-06-06 DIAGNOSIS — M25.562 BILATERAL KNEE PAIN: ICD-10-CM

## 2018-06-06 DIAGNOSIS — M25.561 BILATERAL KNEE PAIN: ICD-10-CM

## 2018-06-06 PROCEDURE — 97530 THERAPEUTIC ACTIVITIES: CPT | Mod: GP | Performed by: PHYSICAL THERAPIST

## 2018-06-06 PROCEDURE — 97110 THERAPEUTIC EXERCISES: CPT | Mod: GP | Performed by: PHYSICAL THERAPIST

## 2018-06-06 PROCEDURE — 97140 MANUAL THERAPY 1/> REGIONS: CPT | Mod: GP | Performed by: PHYSICAL THERAPIST

## 2018-06-06 ASSESSMENT — ACTIVITIES OF DAILY LIVING (ADL)
HOW_WOULD_YOU_RATE_THE_OVERALL_FUNCTION_OF_YOUR_KNEE_DURING_YOUR_USUAL_DAILY_ACTIVITIES?: NORMAL
RISE FROM A CHAIR: ACTIVITY IS NOT DIFFICULT
STAND: ACTIVITY IS NOT DIFFICULT
KNEE_ACTIVITY_OF_DAILY_LIVING_SUM: 63
KNEEL ON THE FRONT OF YOUR KNEE: ACTIVITY IS MINIMALLY DIFFICULT
KNEE_ACTIVITY_OF_DAILY_LIVING_SCORE: 90
GO UP STAIRS: ACTIVITY IS MINIMALLY DIFFICULT
WEAKNESS: I HAVE THE SYMPTOM BUT IT DOES NOT AFFECT MY ACTIVITY
GO DOWN STAIRS: ACTIVITY IS MINIMALLY DIFFICULT
HOW_WOULD_YOU_RATE_THE_CURRENT_FUNCTION_OF_YOUR_KNEE_DURING_YOUR_USUAL_DAILY_ACTIVITIES_ON_A_SCALE_FROM_0_TO_100_WITH_100_BEING_YOUR_LEVEL_OF_KNEE_FUNCTION_PRIOR_TO_YOUR_INJURY_AND_0_BEING_THE_INABILITY_TO_PERFORM_ANY_OF_YOUR_USUAL_DAILY_ACTIVITIES?: 95
LIMPING: I DO NOT HAVE THE SYMPTOM
SIT WITH YOUR KNEE BENT: ACTIVITY IS NOT DIFFICULT
WALK: ACTIVITY IS MINIMALLY DIFFICULT
AS_A_RESULT_OF_YOUR_KNEE_INJURY,_HOW_WOULD_YOU_RATE_YOUR_CURRENT_LEVEL_OF_DAILY_ACTIVITY?: NORMAL
RAW_SCORE: 63
SQUAT: ACTIVITY IS NOT DIFFICULT
PAIN: I HAVE THE SYMPTOM BUT IT DOES NOT AFFECT MY ACTIVITY
SWELLING: I DO NOT HAVE THE SYMPTOM
GIVING WAY, BUCKLING OR SHIFTING OF KNEE: I DO NOT HAVE THE SYMPTOM
STIFFNESS: I HAVE THE SYMPTOM BUT IT DOES NOT AFFECT MY ACTIVITY

## 2018-06-06 NOTE — PROGRESS NOTES
Subjective:  HPI       Knee Activity of Daily Living Score: 90            Objective:  System    Physical Exam    General     ROS    Assessment/Plan:    PROGRESS  REPORT    Progress reporting period is from 3/22/2018 to 6/6/2018.       SUBJECTIVE  Subjective changes noted by patient: Has had a crazy month. Has been doing more belly-dancing. Was able to walk around at a wedding and walking around [place] for 4 days without much issue. Partly due to a good pair of shoes. She did a lot of driving this past week and was a little more sore for that. It also gets a little more painful with some belly-dancing techniques. Also getting pain in the right achilles. The pain in the knee has also been mostly on the right.    Current Pain level: 0/10.     Initial Pain level: 7/10.   Changes in function:  Yes (See Goal flowsheet attached for changes in current functional level)  Adverse reaction to treatment or activity: None    OBJECTIVE  Changes noted in objective findings:  Yes, Patient has demonstrated improvements in functional motions as well as improved strength of the hip/     Strength:   R L   HIP     Flex 5 5   Abd 5- 5-   Ext 4+      Stairs: Able to ascend/descend stairs in normal reciprocal pattern without use of handrail     AROM (PROM): (* indicates patient's pain)   ROM R ROM L   Plantarflexion 52 48   DF, knee straight 10 5   DF, knee bent 18 22       Other:  - tenderness along the IT Band and lateral hamstring, distally more than proximally.       ASSESSMENT/PLAN  Updated problem list and treatment plan: Diagnosis 1:  Bilateral Patellofemoral Pain Syndrome  Pain -  hot/cold therapy, manual therapy, splint/taping/bracing/orthotics, self management, education and home program  Decreased strength - therapeutic exercise, therapeutic activities and home program  Impaired gait - gait training and home program  Impaired muscle performance - neuro re-education and home program  Decreased function - therapeutic activities  and home program    STG/LTGs have been met or progress has been made towards goals:  Yes (See Goal flow sheet completed today.)  Assessment of Progress: The patient's condition is improving.  Patient is meeting short term goals and is progressing towards long term goals.  Self Management Plans:  Patient is independent in a home treatment program.  Patient  has been instructed in self management of symptoms.  I have re-evaluated this patient and find that the nature, scope, duration and intensity of the therapy is appropriate for the medical condition of the patient.  Tiffanie continues to require the following intervention to meet STG and LTG's:  PT    Recommendations:  This patient would benefit from continued therapy.     Patient has some residual pain when more active.   She will follow-up by phone in 1 month--or sooner--at which point the need for additional therapy will be discussed and based on the resolution (or lack thereof) of the residual/remaining symptoms.     Please refer to the daily flowsheet for treatment today, total treatment time and time spent performing 1:1 timed codes.

## 2018-08-08 PROBLEM — M25.562 BILATERAL KNEE PAIN: Status: RESOLVED | Noted: 2018-03-22 | Resolved: 2018-08-08

## 2018-08-08 PROBLEM — M25.561 BILATERAL KNEE PAIN: Status: RESOLVED | Noted: 2018-03-22 | Resolved: 2018-08-08

## 2018-08-08 NOTE — PROGRESS NOTES
Update as of August 8, 2018: Patient has not contacted clinic. This progress note shall serve as a discharge note.

## 2018-09-06 ENCOUNTER — OFFICE VISIT (OUTPATIENT)
Dept: OBGYN | Facility: CLINIC | Age: 33
End: 2018-09-06
Payer: COMMERCIAL

## 2018-09-06 VITALS
SYSTOLIC BLOOD PRESSURE: 114 MMHG | DIASTOLIC BLOOD PRESSURE: 60 MMHG | WEIGHT: 217.3 LBS | BODY MASS INDEX: 36.21 KG/M2 | HEIGHT: 65 IN

## 2018-09-06 DIAGNOSIS — N94.10 DYSPAREUNIA IN FEMALE: ICD-10-CM

## 2018-09-06 DIAGNOSIS — N80.9 ENDOMETRIOSIS: Primary | ICD-10-CM

## 2018-09-06 DIAGNOSIS — R87.810 CERVICAL HIGH RISK HPV (HUMAN PAPILLOMAVIRUS) TEST POSITIVE: ICD-10-CM

## 2018-09-06 DIAGNOSIS — N89.8 VAGINAL DISCHARGE: ICD-10-CM

## 2018-09-06 DIAGNOSIS — N87.1 CIN II (CERVICAL INTRAEPITHELIAL NEOPLASIA II): ICD-10-CM

## 2018-09-06 LAB
SPECIMEN SOURCE: ABNORMAL
WET PREP SPEC: ABNORMAL

## 2018-09-06 PROCEDURE — 87210 SMEAR WET MOUNT SALINE/INK: CPT | Performed by: FAMILY MEDICINE

## 2018-09-06 PROCEDURE — 88175 CYTOPATH C/V AUTO FLUID REDO: CPT | Performed by: FAMILY MEDICINE

## 2018-09-06 PROCEDURE — 99213 OFFICE O/P EST LOW 20 MIN: CPT | Performed by: FAMILY MEDICINE

## 2018-09-06 PROCEDURE — 88141 CYTOPATH C/V INTERPRET: CPT | Performed by: FAMILY MEDICINE

## 2018-09-06 PROCEDURE — 87624 HPV HI-RISK TYP POOLED RSLT: CPT | Performed by: FAMILY MEDICINE

## 2018-09-06 RX ORDER — CLINDAMYCIN HCL 300 MG
300 CAPSULE ORAL 3 TIMES DAILY
Qty: 21 CAPSULE | Refills: 0 | Status: SHIPPED | OUTPATIENT
Start: 2018-09-06 | End: 2018-09-13

## 2018-09-06 NOTE — PROGRESS NOTES
"S:  Has stopped Oral Contraceptive due to mood changes       O: /60  Ht 5' 5\" (1.651 m)  Wt 217 lb 4.8 oz (98.6 kg)  LMP 08/19/2018  BMI 36.16 kg/m2     GENERAL healthy, alert and no distress  CV: NEGATIVE  Chest: CTA   GYN PELVIC: NEGATIVE, normal external genitalia,  normal vaginal mucosa, normal cervix and normal uterus, adnexa, no masses or tenderness    Pap done      Assessment:  33 year old y/o presents with following issues:      1. Hx of abnl pap with LEEP 8/21/18 repeat pap today   2. Endometriosis: use tens unit for pain and tylenol, stopped Oral Contraceptive due to mood changes    Some pain with intercourse x 1 year unable to tolerate Oral Contraceptive   Start orilissa call with symptoms, can add progesterone to help with symptoms      Dr. Ramila Pak, DO    Obstetrics and Gynecology  [facility] [place] and [place]             "

## 2018-09-06 NOTE — MR AVS SNAPSHOT
After Visit Summary   9/6/2018    Tiffanie Tate    MRN: [id number]           Patient Information     Date Of Birth          1985        Visit Information        Provider Department      9/6/2018 11:15 AM Ramila Pak,  [facility]        Today's Diagnoses     Endometriosis    -  1    DESMOND II (cervical intraepithelial neoplasia II)        Cervical high risk HPV (human papillomavirus) test positive        Dyspareunia in female          Care Instructions    Start orilissa call with symptoms, can add progesterone to help with symptoms (hot flashes, mood changes)       Return in 3 months   Dr. Ramila Pak, DO    Obstetrics and Gynecology  [facility] and [place]                 Follow-ups after your visit        Additional Services     [facility] PT, HAND, AND CHIROPRACTIC REFERRAL       **This order will print in the [facility] Scheduling Office**    Physical Therapy, Hand Therapy and Chiropractic Care are available through:    *[facility]  *[facility]  *[facility]    Call one number to schedule at any of the above locations: [phone].    Your provider has referred you to: Physical Therapy at [facility] or [facility]    Indication/Reason for Referral: Women's Health (Please Complete Special Programs SmartList)  Onset of Illness: for about a year  Therapy Orders: Evaluate and Treat  Special Programs: Acupuncture, None and Women's Health: OB/GYN Musculoskeletal Dysfunction:   Pelvic Dysfunction: Dyspareunia:pressure with sexual activity    Special Request: Equipment: As Indicated:  and Louie Straps  Exercise: Active/Assistive ROM, Conditioning and Home Exercise Program  Modalities: As Indicated:   None    Lc Platt      Additional Comments for the Therapist or Chiropractor:     Please be aware that coverage of these services is subject to the terms and limitations of your health insurance plan.  Call member  "services at your health plan with any benefit or coverage questions.      Please bring the following to your appointment:    *Your personal calendar for scheduling future appointments  *Comfortable clothing                  Who to contact     If you have questions or need follow up information about today's clinic visit or your schedule please contact [facility] directly at [phone].  Normal or non-critical lab and imaging results will be communicated to you by MyChart, letter or phone within 4 business days after the clinic has received the results. If you do not hear from us within 7 days, please contact the clinic through [facility] or phone. If you have a critical or abnormal lab result, we will notify you by phone as soon as possible.  Submit refill requests through [employer] or call your pharmacy and they will forward the refill request to us. Please allow 3 business days for your refill to be completed.          Additional Information About Your Visit        MyChart Information     [employer] gives you secure access to your electronic health record. If you see a primary care provider, you can also send messages to your care team and make appointments. If you have questions, please call your primary care clinic.  If you do not have a primary care provider, please call [phone] and they will assist you.        Care EveryWhere ID     This is your Care EveryWhere ID. This could be used by other organizations to access your [place] medical records  [id number]        Your Vitals Were     Height Last Period BMI (Body Mass Index)             5' 5\" (1.651 m) 08/19/2018 36.16 kg/m2          Blood Pressure from Last 3 Encounters:   09/06/18 114/60   03/07/18 112/65   02/27/18 110/60    Weight from Last 3 Encounters:   09/06/18 217 lb 4.8 oz (98.6 kg)   03/07/18 213 lb (96.6 kg)   02/27/18 215 lb 11.2 oz (97.8 kg)              We Performed the Following     [facility] PT, HAND, AND CHIROPRACTIC REFERRAL     "      Today's Medication Changes          These changes are accurate as of 9/6/18 11:55 AM.  If you have any questions, ask your nurse or doctor.               Start taking these medicines.        Dose/Directions    Elagolix Sodium 200 MG Tabs   Commonly known as:  ORILISSA   Used for:  Endometriosis   Started by:  Ramila Pak,         Dose:  1 Dose   Take 1 Dose by mouth 2 times daily   Quantity:  120 tablet   Refills:  3            Where to get your medicines      These medications were sent to [id number] - [facility] [address] AT [facility] [address], [facility] [address]     Phone:  [phone]     Elagolix Sodium 200 MG Tabs                Primary Care Provider Office Phone # Fax #    Ruba Perez [phone] [address] [place] [address]        Equal Access to Services     [facility] Vista[facility]ABIOLA AH: Hadii aad ku hadasho Soomaali, waaxda luqadaha, qaybta kaalmada adeegyada, waxay curtisin haydulcen jenny west . So [facility] [phone].    ATENCIÓN: Si habla español, tiene a iyer disposición servicios gratuitos de asistencia lingüística. [facility] [phone].    We comply with applicable federal civil rights laws and [place] laws. We do not discriminate on the basis of race, color, national origin, age, disability, sex, sexual orientation, or gender identity.            Thank you!     Thank you for choosing [facility]  for your care. Our goal is always to provide you with excellent care. Hearing back from our patients is one way we can continue to improve our services. Please take a few minutes to complete the written survey that you may receive in the mail after your visit with us. Thank you!             Your Updated Medication List - Protect others around you: Learn how to safely use, store and throw away your medicines at www.disposemymeds.org.          This list is accurate as of 9/6/18 11:55  AM.  Always use your most recent med list.                   Brand Name Dispense Instructions for use Diagnosis    cetirizine 10 MG tablet    zyrTEC     Take 10 mg by mouth daily        cyclobenzaprine 5 MG tablet    FLEXERIL    42 tablet    Take 1 tablet (5 mg) by mouth 3 times daily as needed for muscle spasms    Pelvic pain in female       Elagolix Sodium 200 MG Tabs    ORILISSA    120 tablet    Take 1 Dose by mouth 2 times daily    Endometriosis       ibuprofen 800 MG tablet    ADVIL/MOTRIN    90 tablet    Take 1 tablet (800 mg) by mouth every 8 hours as needed for moderate pain    S/P laparoscopy       levonorgest-eth estrad 91-Day 0.15-0.03 &0.01 MG per tablet    SEASONIQUE    91 tablet    Take 1 tablet by mouth daily    Encounter for initial prescription of contraceptive pills       oxyCODONE IR 5 MG tablet    ROXICODONE    18 tablet    Take 1 tablet (5 mg) by mouth every 4 hours as needed for pain maximum 6 tablet(s) per day    S/P laparoscopy       TYLENOL 500 MG tablet   Generic drug:  acetaminophen      Take 1,000 mg by mouth every 6 hours as needed for mild pain

## 2018-09-06 NOTE — NURSING NOTE
"Chief Complaint   Patient presents with     Repeat Pap Smear     Surgical Followup     pelvic procedure 2/21/18--first cycle was uncomfortable--taking birth control end of March and stopped 3 month later due to mood being bad       Initial /60  Ht 5' 5\" (1.651 m)  Wt 217 lb 4.8 oz (98.6 kg)  LMP 08/19/2018  BMI 36.16 kg/m2 Estimated body mass index is 36.16 kg/(m^2) as calculated from the following:    Height as of this encounter: 5' 5\" (1.651 m).    Weight as of this encounter: 217 lb 4.8 oz (98.6 kg).  BP completed using cuff size: large    No obstetric history on file.    The following HM Due: pap smear      "

## 2018-09-06 NOTE — PATIENT INSTRUCTIONS
Start orilissa call with symptoms, can add progesterone to help with symptoms (hot flashes, mood changes)     Return in 3 months     Dr. Ramila Pak, DO    Obstetrics and Gynecology  [facility] [place] and [place]

## 2018-09-07 ENCOUNTER — TELEPHONE (OUTPATIENT)
Dept: OBGYN | Facility: CLINIC | Age: 33
End: 2018-09-07

## 2018-09-07 NOTE — TELEPHONE ENCOUNTER
Jayshree notified us that pts insurance does not cover Orilissa 200 mg tabs.     If a PA is desired, please route this message to the [facility] PA POOL.   Plan # [phone]    Pt ID# [id number]    FLETCHER Rain RN

## 2018-09-10 NOTE — TELEPHONE ENCOUNTER
Please start PA   Dr. Ramila Pak, DO    Obstetrics and Gynecology  [facility] [place] and [place]

## 2018-09-10 NOTE — TELEPHONE ENCOUNTER
PA Initiation    Medication: Orilissa 200mg -   Insurance Company: [employer] - Phone [phone] Fax [phone]  Pharmacy Filling the Rx: [facility] - [place], [place] - [address] AT [facility] [address]  Filling Pharmacy Phone: [phone]  Filling Pharmacy Fax:    Start Date: 9/10/2018

## 2018-09-11 LAB
COPATH REPORT: ABNORMAL
PAP: ABNORMAL

## 2018-09-12 LAB
FINAL DIAGNOSIS: NORMAL
HPV HR 12 DNA CVX QL NAA+PROBE: NEGATIVE
HPV16 DNA SPEC QL NAA+PROBE: NEGATIVE
HPV18 DNA SPEC QL NAA+PROBE: NEGATIVE
SPECIMEN DESCRIPTION: NORMAL
SPECIMEN SOURCE CVX/VAG CYTO: NORMAL

## 2018-09-17 NOTE — TELEPHONE ENCOUNTER
PRIOR AUTHORIZATION DENIED    Medication: Orilissa 200mg - DENIED    Denial Date: 9/17/2018    Denial Rational:      Medication is excluded from patients plan    Appeal Information: N/A

## 2018-11-12 NOTE — PROGRESS NOTES
SUBJECTIVE:   Tiffanie Tate is a 33 year old female who presents to clinic today for the following health issues:      RESPIRATORY SYMPTOMS      Duration: x6 weeks     Description  nasal congestion, cough, wheezing, ear pain left, fatigue/malaise and PND     Severity: severe    Accompanying signs and symptoms: None    History (predisposing factors):  none    Precipitating or alleviating factors: dairy products tend to thicken mucus    Therapies tried and outcome:  OTC medicatios and allergy medications     Elizabeth comes in for evaluation of cough for the past 6 weeks or so. She reports that her symptoms started with a cold. Cold symptoms improved (no longer with runny nose, sore throat or fevers), but continues to have unproductive cough. Seems to be worse at night and will wake her from sleep with coughing fits. Does note that she has a lot of post-nasal drainage, and thinks this is contributing and waking her up in the middle of the night.     Otherwise denies fevers, chills, wheezing, or facial pressure. Does have a hx of allergies, no asthma. Has tried Vicks, cough drops, cold and cough nighttime medications, mucinex without much improvement.     Problem list and histories reviewed & adjusted, as indicated.  Additional history: as documented    Patient Active Problem List   Diagnosis     DESMOND II (cervical intraepithelial neoplasia II)     Cervical high risk HPV (human papillomavirus) test positive     Past Surgical History:   Procedure Laterality Date     CONIZATION LEEP N/A 2/21/2018    Procedure: CONIZATION LEEP;;  Surgeon: Ramila Pak DO;  Location: [facility] OR     DAVINCI PELVIC PROCEDURE N/A 2/21/2018    Procedure: DAVINCI PELVIC PROCEDURE;  Robotic Assisted Endometriosis Resection and fulgeration, Loop Electrosurgical Excision Procedure with Chromotubation;  Surgeon: Ramila Pak DO;  Location: [facility] OR     LEEP TX, CERVICAL  2007, 2018     PE TUBES  1990    and sinus procedure      "  Social History   Substance Use Topics     Smoking status: Never Smoker     Smokeless tobacco: Never Used     Alcohol use 1.2 oz/week     2 Standard drinks or equivalent per week      Comment: occasional     Family History   Problem Relation Age of Onset     Lung Cancer Maternal Grandfather      Alzheimer Disease Paternal Grandmother      HEART DISEASE Paternal Grandfather            Reviewed and updated as needed this visit by clinical staff  Tobacco  Allergies  Meds  Med Hx  Fam Hx  Soc Hx      ROS:  Constitutional, HEENT, cardiovascular, pulmonary, gi  systems are negative, except as otherwise noted.    OBJECTIVE:     /68 (BP Location: Right arm, Patient Position: Chair, Cuff Size: Adult Large)  Pulse 98  Temp 98.9  F (37.2  C) (Tympanic)  Ht 5' 5\" (1.651 m)  Wt 217 lb 1 oz (98.5 kg)  SpO2 98%  BMI 36.12 kg/m2  Body mass index is 36.12 kg/(m^2).  GENERAL: healthy, alert and no distress  EYES: Eyes grossly normal to inspection, PERRL and conjunctivae and sclerae normal  HENT: normal cephalic/atraumatic, ear canals and TM's normal, nose and mouth without ulcers or lesions, oral mucous membranes moist, sinuses: not tender and cobblestoning of posterior oropharynx  NECK: no adenopathy, no asymmetry, masses, or scars and thyroid normal to palpation  RESP: lungs clear to auscultation - no rales, rhonchi or wheezes  CV: regular rate and rhythm, normal S1 S2, no S3 or S4, no murmur, click or rub, no peripheral edema and peripheral pulses strong      ASSESSMENT/PLAN:       ICD-10-CM    1. Cough R05 fluticasone (FLONASE) 50 MCG/ACT spray     fluticasone (FLOVENT HFA) 110 MCG/ACT Inhaler     order for DME   2. PND (post-nasal drip) R09.82 fluticasone (FLONASE) 50 MCG/ACT spray     Cough likely combination of post-viral and postnasal drainage. Reassuring hx and exam. Will start with intranasal steroids and antihistamine to see if this helps with PND; if no improvement can try ICS with spacer. Reviewed " medication side effects; follow-up if not improving.     Patient Instructions   Most likely this cough is post-viral, likely worse due to nasal drainage.    Start with:  -- Flonase nasal spray 2 sprays each nostril twice daily  -- Zyrtec or other antihistamine.   -- push fluids  -- steamy shower/humidifier    If not improving in 2 weeks, start Flovent steroid inhaler. 2 puffs twice daily. Rinse your mouth out after using this.       Ruba Nagel MD  [facility]

## 2018-11-13 ENCOUNTER — OFFICE VISIT (OUTPATIENT)
Dept: PEDIATRICS | Facility: CLINIC | Age: 33
End: 2018-11-13
Payer: COMMERCIAL

## 2018-11-13 VITALS
HEART RATE: 98 BPM | BODY MASS INDEX: 36.17 KG/M2 | TEMPERATURE: 98.9 F | SYSTOLIC BLOOD PRESSURE: 110 MMHG | OXYGEN SATURATION: 98 % | DIASTOLIC BLOOD PRESSURE: 68 MMHG | WEIGHT: 217.06 LBS | HEIGHT: 65 IN

## 2018-11-13 DIAGNOSIS — R09.82 PND (POST-NASAL DRIP): ICD-10-CM

## 2018-11-13 DIAGNOSIS — R05.9 COUGH: Primary | ICD-10-CM

## 2018-11-13 PROCEDURE — 99213 OFFICE O/P EST LOW 20 MIN: CPT | Performed by: INTERNAL MEDICINE

## 2018-11-13 RX ORDER — FLUTICASONE PROPIONATE 50 MCG
2 SPRAY, SUSPENSION (ML) NASAL 2 TIMES DAILY
Qty: 1 BOTTLE | Refills: 11 | Status: SHIPPED | OUTPATIENT
Start: 2018-11-13 | End: 2019-06-28

## 2018-11-13 RX ORDER — FLUTICASONE PROPIONATE 110 UG/1
2 AEROSOL, METERED RESPIRATORY (INHALATION) 2 TIMES DAILY
Qty: 1 INHALER | Refills: 1 | Status: SHIPPED | OUTPATIENT
Start: 2018-11-13 | End: 2019-06-28

## 2018-11-13 NOTE — MR AVS SNAPSHOT
After Visit Summary   11/13/2018    Tiffanie Tate    MRN: [id number]           Patient Information     Date Of Birth          1985        Visit Information        Provider Department      11/13/2018 10:30 AM [facility]        Today's Diagnoses     Cough    -  1    PND (post-nasal drip)          Care Instructions    Most likely this cough is post-viral, likely worse due to nasal drainage.    Start with:  -- Flonase nasal spray 2 sprays each nostril twice daily  -- Zyrtec or other antihistamine.   -- push fluids  -- steamy shower/humidifier    If not improving in 2 weeks, start Flovent steroid inhaler. 2 puffs twice daily. Rinse your mouth out after using this.           Follow-ups after your visit        Who to contact     If you have questions or need follow up information about today's clinic visit or your schedule please contact [facility] directly at [phone].  Normal or non-critical lab and imaging results will be communicated to you by BuildingLayerhart, letter or phone within 4 business days after the clinic has received the results. If you do not hear from us within 7 days, please contact the clinic through Optimus3 or phone. If you have a critical or abnormal lab result, we will notify you by phone as soon as possible.  Submit refill requests through Optimus3 or call your pharmacy and they will forward the refill request to us. Please allow 3 business days for your refill to be completed.          Additional Information About Your Visit        BuildingLayerhart Information     Optimus3 gives you secure access to your electronic health record. If you see a primary care provider, you can also send messages to your care team and make appointments. If you have questions, please call your primary care clinic.  If you do not have a primary care provider, please call [phone] and they will assist you.        Care EveryWhere ID     This is your Care EveryWhere  "ID. This could be used by other organizations to access your [place] medical records  [id number]        Your Vitals Were     Pulse Temperature Height Pulse Oximetry BMI (Body Mass Index)       98 98.9  F (37.2  C) (Tympanic) 5' 5\" (1.651 m) 98% 36.12 kg/m2        Blood Pressure from Last 3 Encounters:   11/13/18 110/68   09/06/18 114/60   03/07/18 112/65    Weight from Last 3 Encounters:   11/13/18 217 lb 1 oz (98.5 kg)   09/06/18 217 lb 4.8 oz (98.6 kg)   03/07/18 213 lb (96.6 kg)              Today, you had the following     No orders found for display         Today's Medication Changes          These changes are accurate as of 11/13/18 10:49 AM.  If you have any questions, ask your nurse or doctor.               Start taking these medicines.        Dose/Directions    fluticasone 110 MCG/ACT Inhaler   Commonly known as:  FLOVENT HFA   Used for:  Cough   Started by:  Ruba Perez MD        Dose:  2 puff   Inhale 2 puffs into the lungs 2 times daily   Quantity:  1 Inhaler   Refills:  1       fluticasone 50 MCG/ACT spray   Commonly known as:  FLONASE   Used for:  Cough, PND (post-nasal drip)   Started by:  Ruba Perze MD        Dose:  2 spray   Spray 2 sprays into both nostrils 2 times daily   Quantity:  1 Bottle   Refills:  11       order for DME   Used for:  Cough   Started by:  Ruba Perez MD        Equipment being ordered: Inhalation Spacer   Quantity:  1 each   Refills:  0            Where to get your medicines      These medications were sent to [id number] - [place], [place] - [address] AT [facility] [address], [facility] [address]     Phone:  [phone]     fluticasone 50 MCG/ACT spray         Some of these will need a paper prescription and others can be bought over the counter.  Ask your nurse if you have questions.     Bring a paper prescription for each of these medications     fluticasone 110 MCG/ACT Inhaler    order for DME                " Primary Care Provider Office Phone # Fax #    Ruba Perez [phone] [address] [place] [address]        Equal Access to Services     MARCELL JAIMEFABIOLA : Hadparis alf salazar yvetteo Kristy, waaxda luqadaha, qaybta kaalmada gmda, grant castellon laazamandra melissa. So [facility] [phone].    ATENCIÓN: Si habla español, tiene a iyer disposición servicios gratuitos de asistencia lingüística. Llame al [phone].    We comply with applicable federal civil rights laws and [place] laws. We do not discriminate on the basis of race, color, national origin, age, disability, sex, sexual orientation, or gender identity.            Thank you!     Thank you for choosing [facility]  for your care. Our goal is always to provide you with excellent care. Hearing back from our patients is one way we can continue to improve our services. Please take a few minutes to complete the written survey that you may receive in the mail after your visit with us. Thank you!             Your Updated Medication List - Protect others around you: Learn how to safely use, store and throw away your medicines at www.disposemymeds.org.          This list is accurate as of 11/13/18 10:49 AM.  Always use your most recent med list.                   Brand Name Dispense Instructions for use Diagnosis    cetirizine 10 MG tablet    zyrTEC     Take 10 mg by mouth daily        cyclobenzaprine 5 MG tablet    FLEXERIL    42 tablet    Take 1 tablet (5 mg) by mouth 3 times daily as needed for muscle spasms    Pelvic pain in female       Elagolix Sodium 200 MG Tabs    ORILISSA    120 tablet    Take 1 Dose by mouth 2 times daily    Endometriosis       fluticasone 110 MCG/ACT Inhaler    FLOVENT HFA    1 Inhaler    Inhale 2 puffs into the lungs 2 times daily    Cough       fluticasone 50 MCG/ACT spray    FLONASE    1 Bottle    Spray 2 sprays into both nostrils 2 times daily    Cough, PND (post-nasal drip)        ibuprofen 800 MG tablet    ADVIL/MOTRIN    90 tablet    Take 1 tablet (800 mg) by mouth every 8 hours as needed for moderate pain    S/P laparoscopy       levonorgest-eth estrad 91-Day 0.15-0.03 &0.01 MG per tablet    SEASONIQUE    91 tablet    Take 1 tablet by mouth daily    Encounter for initial prescription of contraceptive pills       order for DME     1 each    Equipment being ordered: Inhalation Spacer    Cough       oxyCODONE IR 5 MG tablet    ROXICODONE    18 tablet    Take 1 tablet (5 mg) by mouth every 4 hours as needed for pain maximum 6 tablet(s) per day    S/P laparoscopy       TYLENOL 500 MG tablet   Generic drug:  acetaminophen      Take 1,000 mg by mouth every 6 hours as needed for mild pain

## 2018-11-13 NOTE — PATIENT INSTRUCTIONS
Most likely this cough is post-viral, likely worse due to nasal drainage.    Start with:  -- Flonase nasal spray 2 sprays each nostril twice daily  -- Zyrtec or other antihistamine.   -- push fluids  -- steamy shower/humidifier    If not improving in 2 weeks, start Flovent steroid inhaler. 2 puffs twice daily. Rinse your mouth out after using this.

## 2019-03-20 ENCOUNTER — TELEPHONE (OUTPATIENT)
Dept: OBGYN | Facility: CLINIC | Age: 34
End: 2019-03-20

## 2019-03-20 NOTE — TELEPHONE ENCOUNTER
Pt is past due for physical, f/u pap smear/HPV test.  1 reminder letter sent previously.  Left msg today for patient to call clinic to schedule.    If no reply and/or appt within two weeks (4/3/19) patient will be considered lost to pap tracking f/u.    TAVO SiegelN, RN, Pap Tracking Nurse

## 2019-05-16 ENCOUNTER — HEALTH MAINTENANCE LETTER (OUTPATIENT)
Age: 34
End: 2019-05-16

## 2019-06-28 ENCOUNTER — OFFICE VISIT (OUTPATIENT)
Dept: PEDIATRICS | Facility: CLINIC | Age: 34
End: 2019-06-28
Payer: COMMERCIAL

## 2019-06-28 ENCOUNTER — APPOINTMENT (OUTPATIENT)
Dept: GENERAL RADIOLOGY | Facility: CLINIC | Age: 34
End: 2019-06-28
Attending: EMERGENCY MEDICINE
Payer: COMMERCIAL

## 2019-06-28 ENCOUNTER — APPOINTMENT (OUTPATIENT)
Dept: ULTRASOUND IMAGING | Facility: CLINIC | Age: 34
End: 2019-06-28
Attending: EMERGENCY MEDICINE
Payer: COMMERCIAL

## 2019-06-28 ENCOUNTER — APPOINTMENT (OUTPATIENT)
Dept: CT IMAGING | Facility: CLINIC | Age: 34
End: 2019-06-28
Attending: EMERGENCY MEDICINE
Payer: COMMERCIAL

## 2019-06-28 ENCOUNTER — HOSPITAL ENCOUNTER (EMERGENCY)
Facility: CLINIC | Age: 34
Discharge: HOME | End: 2019-06-28
Attending: EMERGENCY MEDICINE | Admitting: EMERGENCY MEDICINE
Payer: COMMERCIAL

## 2019-06-28 VITALS
DIASTOLIC BLOOD PRESSURE: 76 MMHG | BODY MASS INDEX: 36.15 KG/M2 | OXYGEN SATURATION: 97 % | HEART RATE: 89 BPM | SYSTOLIC BLOOD PRESSURE: 110 MMHG | TEMPERATURE: 99.1 F | RESPIRATION RATE: 14 BRPM | WEIGHT: 217 LBS | HEIGHT: 65 IN

## 2019-06-28 VITALS
TEMPERATURE: 98.3 F | BODY MASS INDEX: 36.15 KG/M2 | SYSTOLIC BLOOD PRESSURE: 128 MMHG | WEIGHT: 217 LBS | HEIGHT: 65 IN | DIASTOLIC BLOOD PRESSURE: 72 MMHG | OXYGEN SATURATION: 99 % | HEART RATE: 90 BPM

## 2019-06-28 DIAGNOSIS — R21 RASH AND NONSPECIFIC SKIN ERUPTION: ICD-10-CM

## 2019-06-28 DIAGNOSIS — R10.31 ABDOMINAL PAIN, RIGHT LOWER QUADRANT: ICD-10-CM

## 2019-06-28 DIAGNOSIS — R11.2 NAUSEA AND VOMITING, INTRACTABILITY OF VOMITING NOT SPECIFIED, UNSPECIFIED VOMITING TYPE: ICD-10-CM

## 2019-06-28 DIAGNOSIS — R07.9 CHEST PAIN, UNSPECIFIED TYPE: ICD-10-CM

## 2019-06-28 DIAGNOSIS — M25.473 ANKLE SWELLING, UNSPECIFIED LATERALITY: Primary | ICD-10-CM

## 2019-06-28 DIAGNOSIS — R07.89 CHEST TIGHTNESS: ICD-10-CM

## 2019-06-28 DIAGNOSIS — R10.32 ABDOMINAL PAIN, LEFT LOWER QUADRANT: ICD-10-CM

## 2019-06-28 DIAGNOSIS — R60.0 BILATERAL LEG EDEMA: ICD-10-CM

## 2019-06-28 LAB
ANION GAP SERPL CALCULATED.3IONS-SCNC: 8 MMOL/L (ref 3–14)
BASOPHILS # BLD AUTO: 0 10E9/L (ref 0–0.2)
BASOPHILS NFR BLD AUTO: 0.2 %
BUN SERPL-MCNC: 8 MG/DL (ref 7–30)
CALCIUM SERPL-MCNC: 8.4 MG/DL (ref 8.5–10.1)
CHLORIDE SERPL-SCNC: 109 MMOL/L (ref 94–109)
CO2 SERPL-SCNC: 24 MMOL/L (ref 20–32)
CREAT SERPL-MCNC: 0.61 MG/DL (ref 0.52–1.04)
D DIMER PPP FEU-MCNC: 2.5 UG/ML FEU (ref 0–0.5)
DIFFERENTIAL METHOD BLD: ABNORMAL
EOSINOPHIL # BLD AUTO: 0 10E9/L (ref 0–0.7)
EOSINOPHIL NFR BLD AUTO: 0.1 %
ERYTHROCYTE [DISTWIDTH] IN BLOOD BY AUTOMATED COUNT: 12.6 % (ref 10–15)
GFR SERPL CREATININE-BSD FRML MDRD: >90 ML/MIN/{1.73_M2}
GLUCOSE SERPL-MCNC: 88 MG/DL (ref 70–99)
HCT VFR BLD AUTO: 42.3 % (ref 35–47)
HGB BLD-MCNC: 13.8 G/DL (ref 11.7–15.7)
IMM GRANULOCYTES # BLD: 0 10E9/L (ref 0–0.4)
IMM GRANULOCYTES NFR BLD: 0.3 %
INTERPRETATION ECG - MUSE: NORMAL
LYMPHOCYTES # BLD AUTO: 1.8 10E9/L (ref 0.8–5.3)
LYMPHOCYTES NFR BLD AUTO: 14.2 %
MCH RBC QN AUTO: 29.1 PG (ref 26.5–33)
MCHC RBC AUTO-ENTMCNC: 32.6 G/DL (ref 31.5–36.5)
MCV RBC AUTO: 89 FL (ref 78–100)
MONOCYTES # BLD AUTO: 0.4 10E9/L (ref 0–1.3)
MONOCYTES NFR BLD AUTO: 3.2 %
NEUTROPHILS # BLD AUTO: 10.4 10E9/L (ref 1.6–8.3)
NEUTROPHILS NFR BLD AUTO: 82 %
NRBC # BLD AUTO: 0 10*3/UL
NRBC BLD AUTO-RTO: 0 /100
PLATELET # BLD AUTO: 320 10E9/L (ref 150–450)
POTASSIUM SERPL-SCNC: 3.6 MMOL/L (ref 3.4–5.3)
RBC # BLD AUTO: 4.75 10E12/L (ref 3.8–5.2)
SODIUM SERPL-SCNC: 141 MMOL/L (ref 133–144)
TROPONIN I SERPL-MCNC: <0.015 UG/L (ref 0–0.04)
WBC # BLD AUTO: 12.6 10E9/L (ref 4–11)

## 2019-06-28 PROCEDURE — 25000128 H RX IP 250 OP 636: Performed by: EMERGENCY MEDICINE

## 2019-06-28 PROCEDURE — 25000132 ZZH RX MED GY IP 250 OP 250 PS 637: Performed by: EMERGENCY MEDICINE

## 2019-06-28 PROCEDURE — 93000 ELECTROCARDIOGRAM COMPLETE: CPT | Performed by: NURSE PRACTITIONER

## 2019-06-28 PROCEDURE — 93970 EXTREMITY STUDY: CPT

## 2019-06-28 PROCEDURE — 71260 CT THORAX DX C+: CPT

## 2019-06-28 PROCEDURE — 71046 X-RAY EXAM CHEST 2 VIEWS: CPT

## 2019-06-28 PROCEDURE — 99215 OFFICE O/P EST HI 40 MIN: CPT | Performed by: NURSE PRACTITIONER

## 2019-06-28 PROCEDURE — 25000128 H RX IP 250 OP 636

## 2019-06-28 PROCEDURE — 93005 ELECTROCARDIOGRAM TRACING: CPT

## 2019-06-28 PROCEDURE — 96374 THER/PROPH/DIAG INJ IV PUSH: CPT | Mod: 59

## 2019-06-28 PROCEDURE — 99285 EMERGENCY DEPT VISIT HI MDM: CPT | Mod: 25

## 2019-06-28 PROCEDURE — 85025 COMPLETE CBC W/AUTO DIFF WBC: CPT | Performed by: EMERGENCY MEDICINE

## 2019-06-28 PROCEDURE — 84484 ASSAY OF TROPONIN QUANT: CPT | Performed by: EMERGENCY MEDICINE

## 2019-06-28 PROCEDURE — 80048 BASIC METABOLIC PNL TOTAL CA: CPT | Performed by: EMERGENCY MEDICINE

## 2019-06-28 PROCEDURE — 85379 FIBRIN DEGRADATION QUANT: CPT | Performed by: EMERGENCY MEDICINE

## 2019-06-28 RX ORDER — IOPAMIDOL 755 MG/ML
500 INJECTION, SOLUTION INTRAVASCULAR ONCE
Status: COMPLETED | OUTPATIENT
Start: 2019-06-28 | End: 2019-06-28

## 2019-06-28 RX ORDER — ONDANSETRON 2 MG/ML
INJECTION INTRAMUSCULAR; INTRAVENOUS
Status: COMPLETED
Start: 2019-06-28 | End: 2019-06-28

## 2019-06-28 RX ORDER — ONDANSETRON 4 MG/1
4 TABLET, ORALLY DISINTEGRATING ORAL EVERY 8 HOURS PRN
Qty: 10 TABLET | Refills: 0 | Status: SHIPPED | OUTPATIENT
Start: 2019-06-28 | End: 2019-08-21

## 2019-06-28 RX ORDER — OXYCODONE HYDROCHLORIDE 5 MG/1
5 TABLET ORAL ONCE
Status: COMPLETED | OUTPATIENT
Start: 2019-06-28 | End: 2019-06-28

## 2019-06-28 RX ORDER — ONDANSETRON 4 MG/1
4 TABLET, ORALLY DISINTEGRATING ORAL EVERY 8 HOURS PRN
Qty: 10 TABLET | Refills: 0 | Status: SHIPPED | OUTPATIENT
Start: 2019-06-28 | End: 2019-06-28

## 2019-06-28 RX ORDER — ONDANSETRON 2 MG/ML
4 INJECTION INTRAMUSCULAR; INTRAVENOUS
Status: COMPLETED | OUTPATIENT
Start: 2019-06-28 | End: 2019-06-28

## 2019-06-28 RX ORDER — MULTIVITAMIN WITH IRON
1 TABLET ORAL DAILY
COMMUNITY
End: 2019-10-22

## 2019-06-28 RX ORDER — HYDROCODONE BITARTRATE AND ACETAMINOPHEN 5; 325 MG/1; MG/1
1 TABLET ORAL EVERY 6 HOURS PRN
Qty: 8 TABLET | Refills: 0 | Status: SHIPPED | OUTPATIENT
Start: 2019-06-28 | End: 2019-08-21

## 2019-06-28 RX ADMIN — ONDANSETRON 4 MG: 2 INJECTION INTRAMUSCULAR; INTRAVENOUS at 19:07

## 2019-06-28 RX ADMIN — IOPAMIDOL 76 ML: 755 INJECTION, SOLUTION INTRAVENOUS at 18:25

## 2019-06-28 RX ADMIN — ONDANSETRON HYDROCHLORIDE 4 MG: 2 INJECTION, SOLUTION INTRAMUSCULAR; INTRAVENOUS at 19:07

## 2019-06-28 RX ADMIN — OXYCODONE HYDROCHLORIDE 5 MG: 5 TABLET ORAL at 19:31

## 2019-06-28 RX ADMIN — SODIUM CHLORIDE 89 ML: 9 INJECTION, SOLUTION INTRAVENOUS at 18:25

## 2019-06-28 ASSESSMENT — ENCOUNTER SYMPTOMS
ARTHRALGIAS: 1
NAUSEA: 1
VOMITING: 1
FEVER: 0

## 2019-06-28 ASSESSMENT — MIFFLIN-ST. JEOR
SCORE: 1695.19
SCORE: 1695.19

## 2019-06-28 NOTE — ED AVS SNAPSHOT
[facility] Emergency Department  [address]  [facility] [address]  Phone:  [phone]  Fax:  [phone]                                    Tiffanie Tate   MRN: [id number]    Department:  [facility] Emergency Department   Date of Visit:  6/28/2019           After Visit Summary Signature Page    I have received my discharge instructions, and my questions have been answered. I have discussed any challenges I see with this plan with the nurse or doctor.    ..........................................................................................................................................  Patient/Patient Representative Signature      ..........................................................................................................................................  Patient Representative Print Name and Relationship to Patient    ..................................................               ................................................  Date                                   Time    ..........................................................................................................................................  Reviewed by Signature/Title    ...................................................              ..............................................  Date                                               Time          22EPIC Rev 08/18

## 2019-06-28 NOTE — ED NOTES
Pt resting Comfortably on bed.  Alert & Oriented  Pt C/O Nausea and swelling of hands and feet  Extremities: Swelling of hands and feet noted. No discoloration CMS intact.

## 2019-06-28 NOTE — PROGRESS NOTES
Subjective     Tiffanie Tate is a 33 year old female who presents to clinic today with significant other with multiple complaints:    HPI   Swelling     Onset: yesterday morning     Description:   Location: both feet, and radiating to wrists and some new rashes on arms   Character: Dull ache and Stabbing    Intensity: severe    Progression of Symptoms: worse, has been throwing up, drinking tons of water cannot drink enough     Accompanying Signs & Symptoms:  Other symptoms: swelling    History:   Previous similar pain: YES      Precipitating factors:   Trauma or overuse: no     Alleviating factors:  Improved by: nothing    Therapies Tried and outcome:Magnesium, Elevation, Ibuprofen doesn't help. Was seen in UC for joint pain, was just given pain medication. Went away on own after 4 days    Problems:  1. Feet swelling: see above for details. Sudden onset yesterday morning of b/l feet swelling. Feet feel heavy, crampy, and painful, jamari to bottom of feet with weight bearing. Thinks her feet look a little red, but no open wounds or warmth. Has hx of multiple joint pain in 2017 and had mildly elevated CRP at that time otherwise rheum labs neg, but she reports this pain feels different today. Has been elevating her feet and pushing fluids without improvement of symptoms. Denies numbness/tingling or weakness of extremities.    2. Chest tightness: onset this morning, sudden. Denies chest pain but feels tight across her chest. Denies shortness of breath,fever, lightheadedness, dizziness, cough, wheeze, or headache. Hx of seasonal allergies and asthma as a child but says she has outgrown this and has had no problems as an adult with this.    3. Nausea/Vomiting: onset this morning, sudden. Estimates she has vomited at least 12 times today. Feels very nauseated. Denies any blood in stool or vomit, fever, or abdominal pain. Also has a little diarrhea, but she started menstruating this morning and states this is normal  "for her with her periods nad will also occ vomit with menstruation but never this severe.    4. Rash: onset 3 days ago. Small red bumps. Itchy. Looks like little blisters. Located to hands and arms primarily.    Of note, no recent travel outside of the country or outdoor [place]. No immobility or long travel.    Reviewed and updated as needed this visit by Provider  Meds  Problems       Review of Systems   ROS COMP: Constitutional, HEENT, cardiovascular, pulmonary, GI, , musculoskeletal, neuro, skin, endocrine and psych systems are negative, except as otherwise noted.      Objective    /72 (BP Location: Right arm, Patient Position: Chair, Cuff Size: Adult Regular)   Pulse 90   Temp 98.3  F (36.8  C) (Tympanic)   Ht 1.651 m (5' 5\")   Wt 98.4 kg (217 lb)   SpO2 99%   BMI 36.11 kg/m    Body mass index is 36.11 kg/m .  Physical Exam   GENERAL: healthy, alert and no distress  EYES: Eyes grossly normal to inspection, PERRL and conjunctivae and sclerae normal  HENT: ear canals and TM's normal, nose and mouth without ulcers or lesions  NECK: no adenopathy, no asymmetry, masses, or scars   RESP: lungs clear to auscultation - no rales, rhonchi or wheezes, no acute resp distress or labored breathing  CV: regular rate and rhythm, normal S1 S2, no S3 or S4, no murmur, click or rub  ABDOMEN: tenderness to RLQ and LLQ, no organomegaly or masses and bowel sounds normal  MS: +2 b/l edema to feet and ankles and peripheral pulses normal, diffuse tenderness to b/l feet and inner/outer aspect of ankles  SKIN: scattered erythematous papules, noted at least x2 to right arm  NEURO: Normal strength and tone, mentation intact and speech normal, CN II-XII grossly intact    Diagnostic Test Results:  EKG - normal sinus rhythm      Assessment & Plan     ICD-10-CM    1. Ankle swelling, unspecified laterality M25.473    2. Chest tightness R07.89 EKG 12-lead complete w/read - Clinics   3. Nausea and vomiting, intractability of vomiting " not specified, unspecified vomiting type R11.2    4. Abdominal pain, left lower quadrant R10.32    5. Abdominal pain, right lower quadrant R10.31    6. Rash and nonspecific skin eruption R21      Due to multiple complaints today and the need for stat labs and imaging the patient was sent to ED for further eval. EKG done prior to discharge which was normal without sign of acute infarct. Feel she is stable to have significant other transport.  Differentials to consider for the above complaints of ankle swelling/chest tightness include PE, DVT, electrolyte disturbance, or pneumonia. Feel she needs imaging of her abdomen due to severity of vomiting and tenderness on exam. Rash may be unrelated to above complaints but also needs to be considered in relationship to her joint pain considering she has had previous episode of multiple joint pain with rash in the past 2 years and was recommended to see if Rheum if this persisted.    No follow-ups on file.    Rosalinda Fowler NP  [facility]

## 2019-06-28 NOTE — ED PROVIDER NOTES
"  History     Chief Complaint:    Multiple Complaints      HPI   Tiffanie Tate is a 33 year old female who presents to the ED for evaluation of swelling in her bilateral feet and chest tightness. The patient states that she initially developed swelling and pain in her bilateral feet and ankles yesterday morning. She reports that her foot swelling got progressively worse throughout the day yesterday. Last night, she tried icing and elevating her feet, however, this morning her feet were increasingly swollen and painful. This morning she also states that she woke up with mild chest tightness as well some swelling in her wrists and fingers. She initially presented to her clinic but was subsequently presented to the ED for further evaluation of her symptoms. She otherwise denies any hives, or fevers. She also denies any hormone use or history of DVT/PE. Of note, the patient states that she just started her cycle and is nauseous and vomiting.     Allergies:  Sulfa     Medications:    Zyrtec  Magnesium     Past Medical History:    Abnormal pap smear of cervix  High risk HPV  Cervical intraepithelial neoplasia II   Endometriosis    Past Surgical History:    Conization LEEP  DaVinci endometriosis resection  LEEP Tx, cervical  PE tubes and sinus procedure    Family History:    No past pertinent family history.    Social History:  Negative for tobacco use.  Positive for alcohol use.   Marital Status:  Single      Review of Systems   Constitutional: Negative for fever.   Gastrointestinal: Positive for nausea and vomiting.   Musculoskeletal: Positive for arthralgias.        Bilateral feet swelling and pain   Skin: Negative for rash.   All other systems reviewed and are negative.    Physical Exam   First Vitals:  BP: 132/78  Pulse: 86  Temp: 99.1  F (37.3  C)  Resp: 18  Height: 165.1 cm (5' 5\")  Weight: 98.4 kg (217 lb)  SpO2: 100 %    Physical Exam    General: Patient is alert and interactive when I enter the " room  Head:  The scalp, face, and head appear normal  Eyes:  Conjunctivae are normal  ENT:    The nose is normal    Pinnae are normal    External acoustic canals are normal  Neck:  Trachea midline  CV:  Pulses are normal.  RRR  Resp:  No respiratory distress, CTAB  Abdomen:      Soft, non-tender, non-distended  Musc:  Normal muscular tone    No major joint effusions    Mild trace pedal edema, non-pitting    Feet well perfused    No overlying erythema   Skin:  Mild erythematous papules on right hand   Neuro:  Speech is normal and fluent. Face is symmetric.     Moving all extremities well.   Psych: Awake. Alert.  Normal affect.  Appropriate interactions.    Emergency Department Course   ECG:  Indication: Chest tightness  Time: 1649  Vent. Rate 94 bpm. OR interval 148. QRS duration 80. QT/QTc 380/475. P-R-T axis 57 18 24.  Normal sinus rhythm. Normal ECG. Read time: 1649    Imaging:  Radiographic findings were communicated with the patient who voiced understanding of the findings.  XR Chest 2 views:   No acute abnormality as per radiology.    US Lower Extremity Venous Duplex, bilateral:   No DVT as per radiology.    CT Chest Pulmonary embolism w/ contrast   There is no pulmonary embolus, aortic aneurysm or  dissection. No acute abnormality as per radiology.     Laboratory:  CBC: WBC: 12.6 (H), HGB: 13.8, PLT: 320  BMP: Calcium 8.4 (L), o/w WNL (Creatinine: 0.61)    D dimer: 2.5  1721 Troponin: <0.015    Interventions:  1907 Zofran 4 mg IV  1931 Oxycodone 5 mg PO    Emergency Department Course:  Nursing notes and vitals reviewed. (1643) I performed an exam of the patient as documented above.     EKG obtained in the ED, see results above.     IV inserted. Medicine administered as documented above. Blood drawn. This was sent to the lab for further testing, results above.     The patient was sent for a BLE venous duplex US, chest CT and chest XR while in the emergency department, findings above.     1915 I rechecked the  patient and discussed the results of her workup thus far.     Findings and plan explained to the Patient. Patient discharged home with instructions regarding supportive care, medications, and reasons to return. The importance of close follow-up was reviewed. The patient was prescribed Norco and Zofran.     I personally reviewed the laboratory results with the Patient and answered all related questions prior to discharge.   Impression & Plan    Medical Decision Making:  Elizabeth Tate is a 33 year old female with history of endometriosis who presents with multiple complaints. She is concerned about the swelling in her legs as well as chest tightness. She also recently started her cycle so she is having vomiting as well. She doesn't really have much peripheral edema, maybe some trace pedal edema. There is certainly no discoloration and her extremities are well perfused. She states a similar episode several months ago that resolved on its own so it's possible she has some type of arthropathy. Her blood work was unremarkable. Her US of her legs showed no DVT. Her dimer was elevated so we did do a CT PE study given her chest symptoms. EKG and troponin was also unremarkable. Patient and her friend and I had a very long discussion about the next steps. She's having difficulty dealing with this pain. They are frustrated with the fact that they are sent here. I told them from a chest pain workup she is fine to go home and follow up with her primary. I don't think that we're able to answer her legs swelling issue today but we can certainly give her some pain medications to get her through the next few days but she definitely needs to follow up with her primary and possible rheumatology for further tests. She could have RA or some degree of autoimmune arthropathy but we're unable to do this testing here. Despite the face that,she was sent here, we are unable to answer her issue of her swelling and joint pain so she will need  to go back to her PCP . Patient was also given nausea medication and pain medication to get her through the weekend. Patient discharged.        Diagnosis:    ICD-10-CM    1. Bilateral leg edema R60.0    2. Chest pain, unspecified type R07.9        Disposition:  discharged to home    Discharge Medications:     Medication List      Started    HYDROcodone-acetaminophen 5-325 MG tablet  Commonly known as:  NORCO  1 tablet, Oral, EVERY 6 HOURS PRN     ondansetron 4 MG ODT tab  Commonly known as:  ZOFRAN ODT  4 mg, Oral, EVERY 8 HOURS PRN            Scribe Disclosure:  I,  Roni Das, am serving as a scribe on 6/28/2019 at 4:43 PM to personally document services performed by Rosie Real MD based on my observations and the provider's statements to me.        Roni Das  6/28/2019   [facility] EMERGENCY DEPARTMENT       Rosie Real MD  06/28/19 [id number]

## 2019-06-28 NOTE — ED TRIAGE NOTES
ABCs intact. Pt c/o bilateral feet and hand swelling, chest tightness since this morning, n/v/d since this morning, abdominal pain. LMP today. Pt states she usually has abdominal pain and diarrhea with her period. Denies urinary symptoms. Denies black or bloody vomit or stool.

## 2019-08-21 ENCOUNTER — OFFICE VISIT (OUTPATIENT)
Dept: URGENT CARE | Facility: URGENT CARE | Age: 34
End: 2019-08-21
Payer: COMMERCIAL

## 2019-08-21 VITALS
OXYGEN SATURATION: 98 % | TEMPERATURE: 99.2 F | BODY MASS INDEX: 35.61 KG/M2 | WEIGHT: 214 LBS | SYSTOLIC BLOOD PRESSURE: 117 MMHG | HEART RATE: 103 BPM | RESPIRATION RATE: 18 BRPM | DIASTOLIC BLOOD PRESSURE: 81 MMHG

## 2019-08-21 DIAGNOSIS — R50.9 FEVER IN ADULT: Primary | ICD-10-CM

## 2019-08-21 DIAGNOSIS — J06.9 VIRAL URI: ICD-10-CM

## 2019-08-21 LAB
DEPRECATED S PYO AG THROAT QL EIA: NORMAL
SPECIMEN SOURCE: NORMAL

## 2019-08-21 PROCEDURE — 87880 STREP A ASSAY W/OPTIC: CPT | Performed by: FAMILY MEDICINE

## 2019-08-21 PROCEDURE — 87081 CULTURE SCREEN ONLY: CPT | Performed by: FAMILY MEDICINE

## 2019-08-21 PROCEDURE — 99213 OFFICE O/P EST LOW 20 MIN: CPT | Performed by: FAMILY MEDICINE

## 2019-08-21 NOTE — PROGRESS NOTES
SUBJECTIVE: Tiffanie Tate is a 33 year old female presenting with a chief complaint of nasal congestion, cough  and sore throat.  Onset of symptoms was 4 day(s) ago.  Course of illness is same.    Severity moderate  Current and Associated symptoms: stuffy nose  Treatment measures tried include Tylenol/Ibuprofen.  Predisposing factors include None.    Past Medical History:   Diagnosis Date     Abnormal Pap smear of cervix 09/06/2018    see problem list     Cervical high risk HPV (human papillomavirus) test positive 10/04/2017    see Problem List     H/O colposcopy with cervical biopsy 02/15/2018    see problem list     History of asthma 1991    resolved after PE tubes and sinus surgery, allergy treatment     Allergies   Allergen Reactions     Dust Mites      Sulfa Drugs Nausea and Vomiting     Social History     Tobacco Use     Smoking status: Never Smoker     Smokeless tobacco: Never Used   Substance Use Topics     Alcohol use: Yes     Alcohol/week: 1.2 oz     Types: 2 Standard drinks or equivalent per week     Comment: occasional       ROS:  SKIN: no rash  GI: no vomiting    OBJECTIVE:  /81   Pulse 103   Temp 99.2  F (37.3  C) (Oral)   Resp 18   Wt 97.1 kg (214 lb)   SpO2 98%   BMI 35.61 kg/m  GENERAL APPEARANCE: healthy, alert and no distress  EYES: EOMI,  PERRL, conjunctiva clear  HENT: ear canals and TM's normal.  Nose and mouth without ulcers, erythema or lesions  NECK: supple, nontender, no lymphadenopathy  RESP: lungs clear to auscultation - no rales, rhonchi or wheezes  SKIN: no suspicious lesions or rashes      ICD-10-CM    1. Fever in adult R50.9 Rapid strep screen     Beta strep group A culture   2. Viral URI J06.9      OTC meds  Fluids/Rest, f/u if worse/not any better

## 2019-08-22 LAB
BACTERIA SPEC CULT: NORMAL
SPECIMEN SOURCE: NORMAL

## 2019-10-02 ENCOUNTER — HEALTH MAINTENANCE LETTER (OUTPATIENT)
Age: 34
End: 2019-10-02

## 2019-10-22 ENCOUNTER — OFFICE VISIT (OUTPATIENT)
Dept: OBGYN | Facility: CLINIC | Age: 34
End: 2019-10-22
Payer: COMMERCIAL

## 2019-10-22 ENCOUNTER — RESULT FOLLOW UP (OUTPATIENT)
Dept: OBGYN | Facility: CLINIC | Age: 34
End: 2019-10-22

## 2019-10-22 VITALS — WEIGHT: 220.7 LBS | DIASTOLIC BLOOD PRESSURE: 74 MMHG | SYSTOLIC BLOOD PRESSURE: 122 MMHG | BODY MASS INDEX: 36.73 KG/M2

## 2019-10-22 DIAGNOSIS — B96.89 BV (BACTERIAL VAGINOSIS): ICD-10-CM

## 2019-10-22 DIAGNOSIS — M25.50 ARTHRALGIA, UNSPECIFIED JOINT: ICD-10-CM

## 2019-10-22 DIAGNOSIS — N76.0 BV (BACTERIAL VAGINOSIS): ICD-10-CM

## 2019-10-22 DIAGNOSIS — R10.2 PELVIC PAIN IN FEMALE: Primary | ICD-10-CM

## 2019-10-22 DIAGNOSIS — Z00.00 ENCOUNTER FOR PREVENTATIVE ADULT HEALTH CARE EXAMINATION: ICD-10-CM

## 2019-10-22 DIAGNOSIS — N80.9 ENDOMETRIOSIS: ICD-10-CM

## 2019-10-22 LAB
SPECIMEN SOURCE: ABNORMAL
WET PREP SPEC: ABNORMAL

## 2019-10-22 PROCEDURE — 87491 CHLMYD TRACH DNA AMP PROBE: CPT | Performed by: FAMILY MEDICINE

## 2019-10-22 PROCEDURE — 88175 CYTOPATH C/V AUTO FLUID REDO: CPT | Performed by: FAMILY MEDICINE

## 2019-10-22 PROCEDURE — 99395 PREV VISIT EST AGE 18-39: CPT | Performed by: FAMILY MEDICINE

## 2019-10-22 PROCEDURE — 87624 HPV HI-RISK TYP POOLED RSLT: CPT | Performed by: FAMILY MEDICINE

## 2019-10-22 PROCEDURE — 87210 SMEAR WET MOUNT SALINE/INK: CPT | Performed by: FAMILY MEDICINE

## 2019-10-22 PROCEDURE — 87591 N.GONORRHOEAE DNA AMP PROB: CPT | Performed by: FAMILY MEDICINE

## 2019-10-22 RX ORDER — METRONIDAZOLE 500 MG/1
500 TABLET ORAL 2 TIMES DAILY
Qty: 14 TABLET | Refills: 0 | Status: SHIPPED | OUTPATIENT
Start: 2019-10-22 | End: 2019-10-29

## 2019-10-22 RX ORDER — KETOROLAC TROMETHAMINE 10 MG/1
10 TABLET, FILM COATED ORAL EVERY 6 HOURS PRN
Qty: 90 TABLET | Refills: 3 | Status: SHIPPED | OUTPATIENT
Start: 2019-10-22 | End: 2023-08-24

## 2019-10-22 NOTE — PROGRESS NOTES
SUBJECTIVE:  Tiffanie Tate is an 34 year old  woman who presents for   annual gyn exam. Patient's last menstrual period was 10/14/2019. Periods are regular q 24 days, lasting   7 days. Dysmenorrhea:none. Cyclic symptoms   include pelvic pain/cramping. No intermenstrual bleeding,   spotting, or discharge.  Menarche age 13.  STD hx: none.    Current contraception: none  MARIA DEL ROSARIO exposure: no  History of abnormal Pap smear: Yes:     10/4/17 NIL, +HPV 16. Plan colp  2/15/18 [place] bx: DESMOND 1, ECC: DESMOND 1 & DESMOND 2. Plan LEEP  18 LEEP bx: DESMOND 2. Plan 6 month pap  18 ASCUS pap, neg HR HPV. Plan 6 month cotest      Family history of uterine or ovarian cancer: No  Regular self breast exam: No  History of abnormal mammogram: No  Family history of breast cancer: Yes: Maternal Aunt  History of abnormal lipids: No    Hx of endometriosis. Status post endometriosis resection in 2018. Patient reports worsening pelvic pain with her menstrual periods. She also notes pain with intercourse. In the past, she did not tolerate the birth control.     Patient experiences joint pain/swelling prior to her menstrual period. The pain is located at her knees, feet, elbows, and hands. This pain makes it difficult to walk.     Past Medical History:   Diagnosis Date     Abnormal Pap smear of cervix 2018    see problem list     Cervical high risk HPV (human papillomavirus) test positive 10/04/2017    see Problem List     H/O colposcopy with cervical biopsy 02/15/2018    see problem list     History of asthma 1991    resolved after PE tubes and sinus surgery, allergy treatment        Family History   Problem Relation Age of Onset     Lung Cancer Maternal Grandfather      Alzheimer Disease Paternal Grandmother      Heart Disease Paternal Grandfather        Past Surgical History:   Procedure Laterality Date     CONIZATION LEEP N/A 2018    Procedure: CONIZATION LEEP;;  Surgeon: Ramila Pak DO;  Location:  OR      DAVINCI PELVIC PROCEDURE N/A 2/21/2018    Procedure: DAVINCI PELVIC PROCEDURE;  Robotic Assisted Endometriosis Resection and fulgeration, Loop Electrosurgical Excision Procedure with Chromotubation;  Surgeon: Ramila Pak DO;  Location: [facility] OR     LEEP TX, CERVICAL  2007, 2018     PE TUBES  1990    and sinus procedure       Current Outpatient Medications   Medication     acetaminophen (TYLENOL) 500 MG tablet     cetirizine (ZYRTEC) 10 MG tablet     No current facility-administered medications for this visit.      Allergies   Allergen Reactions     Dust Mites      Sulfa Drugs Nausea and Vomiting       Social History     Tobacco Use     Smoking status: Never Smoker     Smokeless tobacco: Never Used   Substance Use Topics     Alcohol use: Yes     Alcohol/week: 2.0 standard drinks     Types: 2 Standard drinks or equivalent per week     Comment: occasional       Review Of Systems  Ears/Nose/Throat: negative  Respiratory: No shortness of breath, dyspnea on exertion, cough, or hemoptysis  Cardiovascular: negative  Gastrointestinal: negative  Genitourinary: See HPI  Constitutional, HEENT, cardiovascular, pulmonary, GI, , musculoskeletal, neuro, skin, endocrine and psych systems are negative, except as otherwise noted.    This document serves as a record of the services and decisions personally performed and made by Ramila Pak DO. It was created on her behalf by Belem Martin, a trained medical scribe. The creation of this document is based on the provider's statements to the medical scribe.  Belem Martin 10:50 AM October 22, 2019    OBJECTIVE:  /74 (BP Location: Left arm, Patient Position: Chair, Cuff Size: Adult Large)   Wt 100.1 kg (220 lb 11.2 oz)   LMP 10/14/2019   BMI 36.73 kg/m      General appearance: healthy, alert and no distress  Skin: Skin color, texture, turgor normal. No rashes or lesions.  Ears: negative  Nose/Sinuses: Nares normal. Septum midline. Mucosa normal. No drainage or sinus  tenderness.  Oropharynx: Lips, mucosa, and tongue normal. Teeth and gums normal.  Neck: Neck supple. No adenopathy. Thyroid symmetric, normal size,, Carotids without bruits.  Lungs: negative, Percussion normal. Good diaphragmatic excursion. Lungs clear  Heart: negative, PMI normal. No lifts, heaves, or thrills. RRR. No murmurs, clicks gallops or rub  Breasts: Inspection negative. No nipple discharge or bleeding. No masses.  Abdomen: Abdomen soft, non-tender. BS normal. No masses, organomegaly  Pelvic: Pelvic:  Pelvic examination with pap/ without Gonorrhea and Chlamydia   including  External genitalia normal   and vagina normal rugatted not atrophic  Examination of urethra  normal no masses, tenderness, scarring  bladder, no masses or tenderness  Cervix no lesions or discharge  Bimanual exam with   Uterus 6 weeks size, mid position, mobile, no tenderness, no descent   Adnexa/parametria  Normal, no masses       ASSESSMENT:  Tiffanie Tate is an 34 year old  woman who presents for   annual gyn exam. Concerns:  Pelvic Pain, Joint Pain     PLAN:  Dx:  1)  Pap smear today - pathology pending. STD screening today.   2)  Mammography, lipids at appropriate intervals  3)  Joint Pain -  Labs pending, will establish care with a PCP to follow-up   4)  Endometriosis - Referred to physical therapy. Rx: Progesterone. Rx: Orilissa. Labs pending.   Toradol rx for pain   5)  Return: Will notify patient with results.     Rx:   - progesterone (PROMETRIUM) 200 MG capsule; Place 1 capsule (200 mg) vaginally daily    - Elagolix Sodium (ORILISSA) 200 MG TABS; Take 1 tablet by mouth daily  - ketorolac (TORADOL) 10 MG tablet; Take 1 tablet (10 mg) by mouth every 6 hours as needed for moderate pain        PE:  Reviewed health maintenance including diet, regular exercise   and periodic exams.    The information in this document, created by the medical scribe for me, accurately reflects the services I personally performed and the  decisions made by me. I have reviewed and approved this document for accuracy prior to leaving the patient care area.  October 22, 2019 11:10 AM    Dr. Ramila Pak,     Obstetrics and Gynecology  [facility]

## 2019-10-22 NOTE — PATIENT INSTRUCTIONS
Basia WHITNEY for primary care   Return yearly     Labs today     Dr. Ramila Pak, DO    Obstetrics and Gynecology  [facility] - [place] and [place]

## 2019-10-22 NOTE — NURSING NOTE
"Chief Complaint   Patient presents with     Physical     last pap was 09/06/2018- ASCUS with negative HPV.      Consult     spotting between periods, painful intercouse, nausea and pelvic pain since ablation surgery in 2018.      Second Opinion     for joint pain 2 days before period starts.        Initial /74 (BP Location: Left arm, Patient Position: Chair, Cuff Size: Adult Large)   Wt 100.1 kg (220 lb 11.2 oz)   LMP 10/14/2019   BMI 36.73 kg/m   Estimated body mass index is 36.73 kg/m  as calculated from the following:    Height as of 6/28/19: 1.651 m (5' 5\").    Weight as of this encounter: 100.1 kg (220 lb 11.2 oz).  BP completed using cuff size: regular    Questioned patient about current smoking habits.  Pt. has never smoked.      No obstetric history on file.    The following HM Due: madelin Johnson CMA               "

## 2019-10-23 LAB
C TRACH DNA SPEC QL NAA+PROBE: NEGATIVE
N GONORRHOEA DNA SPEC QL NAA+PROBE: NEGATIVE
SPECIMEN SOURCE: NORMAL
SPECIMEN SOURCE: NORMAL

## 2019-10-24 LAB
COPATH REPORT: NORMAL
PAP: NORMAL

## 2019-10-29 NOTE — PROGRESS NOTES
10/4/17 NIL, +HPV 16. Plan colp  2/15/18 [place] bx: DESMOND 1, ECC: DESMOND 1 & DESMOND 2. Plan LEEP  2/21/18 [place] bx: DESMOND 2. Plan 6 month pap  9/6/18 ASCUS pap, neg HR HPV. Plan 6 month cotest  4/4/19 Lost to follow-up for pap tracking  10/22/19 NIL pap, neg HR HPV. Plan : cotest in 1 year

## 2019-11-11 ENCOUNTER — TELEPHONE (OUTPATIENT)
Dept: OBGYN | Facility: CLINIC | Age: 34
End: 2019-11-11

## 2019-11-11 DIAGNOSIS — R10.2 PELVIC PAIN IN FEMALE: ICD-10-CM

## 2019-11-11 RX ORDER — CYCLOBENZAPRINE HCL 5 MG
5 TABLET ORAL 3 TIMES DAILY PRN
Qty: 42 TABLET | Refills: 3 | Status: SHIPPED | OUTPATIENT
Start: 2019-11-11 | End: 2020-11-12

## 2019-11-11 NOTE — TELEPHONE ENCOUNTER
Pt calling.   She was given an rx for Toradol, which her insurance won't cover.     Pt is hoping that you can rx something else for her.   States that you had given her something different last year, believes it was flexeril.    Tata CARUSO RN

## 2019-11-11 NOTE — TELEPHONE ENCOUNTER
Detailed message left for pt on her personal vm of her cell stating that the rx was faxed in.      Tata CARUSO RN

## 2019-11-11 NOTE — TELEPHONE ENCOUNTER
Ok, rx flexeril called in   Dr. Ramila Pak, DO    Obstetrics and Gynecology  [facility] - [place] and [place]

## 2020-01-23 ENCOUNTER — OFFICE VISIT (OUTPATIENT)
Dept: OBGYN | Facility: CLINIC | Age: 35
End: 2020-01-23
Payer: COMMERCIAL

## 2020-01-23 VITALS
BODY MASS INDEX: 37.87 KG/M2 | WEIGHT: 227.3 LBS | HEIGHT: 65 IN | SYSTOLIC BLOOD PRESSURE: 118 MMHG | DIASTOLIC BLOOD PRESSURE: 82 MMHG

## 2020-01-23 DIAGNOSIS — N80.9 ENDOMETRIOSIS: Primary | ICD-10-CM

## 2020-01-23 DIAGNOSIS — N87.1 CIN II (CERVICAL INTRAEPITHELIAL NEOPLASIA II): ICD-10-CM

## 2020-01-23 DIAGNOSIS — R87.810 CERVICAL HIGH RISK HPV (HUMAN PAPILLOMAVIRUS) TEST POSITIVE: ICD-10-CM

## 2020-01-23 PROCEDURE — 99213 OFFICE O/P EST LOW 20 MIN: CPT | Performed by: FAMILY MEDICINE

## 2020-01-23 RX ORDER — MELOXICAM 15 MG/1
15 TABLET ORAL DAILY
Qty: 30 TABLET | Refills: 11 | Status: SHIPPED | OUTPATIENT
Start: 2020-01-23 | End: 2023-08-24

## 2020-01-23 ASSESSMENT — MIFFLIN-ST. JEOR: SCORE: 1736.91

## 2020-01-23 NOTE — PROGRESS NOTES
SUBJECTIVE:  Tiffanie Tate is an 34 year old  woman who presents for repeat pap smear . She was seen on 10/22/19, for annual physical/endometriosis.     Hx of endometriosis. Status post endometriosis resection in 2018. Patient continues to have pelvic pain with her menstrual periods. She lost the vaginal progesterone, but recently found it and plans to start it. Patient did not start Orilissa and toradol because her insurance did not cover it.     Past Medical History:   Diagnosis Date     Abnormal Pap smear of cervix 2018    see problem list     Cervical high risk HPV (human papillomavirus) test positive 10/04/2017    see Problem List     H/O colposcopy with cervical biopsy 02/15/2018    see problem list     History of asthma 1991    resolved after PE tubes and sinus surgery, allergy treatment          Family History   Problem Relation Age of Onset     Lung Cancer Maternal Grandfather      Alzheimer Disease Paternal Grandmother      Heart Disease Paternal Grandfather        Past Surgical History:   Procedure Laterality Date     CONIZATION LEEP N/A 2018    Procedure: CONIZATION LEEP;;  Surgeon: Ramila Pak DO;  Location: [facility] OR     DAVINCI PELVIC PROCEDURE N/A 2018    Procedure: DAVINCI PELVIC PROCEDURE;  Robotic Assisted Endometriosis Resection and fulgeration, Loop Electrosurgical Excision Procedure with Chromotubation;  Surgeon: Ramila Pak DO;  Location: [facility] OR     LEEP TX, CERVICAL  2018     PE TUBES      and sinus procedure       Current Outpatient Medications   Medication     acetaminophen (TYLENOL) 500 MG tablet     cyclobenzaprine (FLEXERIL) 5 MG tablet     meloxicam (MOBIC) 15 MG tablet     cetirizine (ZYRTEC) 10 MG tablet     Elagolix Sodium (ORILISSA) 200 MG TABS     ketorolac (TORADOL) 10 MG tablet     progesterone (PROMETRIUM) 200 MG capsule     No current facility-administered medications for this visit.      Allergies   Allergen Reactions  "    Dust Mites      Sulfa Drugs Nausea and Vomiting       Social History     Tobacco Use     Smoking status: Never Smoker     Smokeless tobacco: Never Used   Substance Use Topics     Alcohol use: Yes     Alcohol/week: 2.0 standard drinks     Types: 2 Standard drinks or equivalent per week     Comment: occasional       Review Of Systems  Ears/Nose/Throat: negative  Respiratory: No shortness of breath, dyspnea on exertion, cough, or hemoptysis  Cardiovascular: negative  Gastrointestinal: negative  Genitourinary: See HPI   Constitutional, HEENT, cardiovascular, pulmonary, GI, , musculoskeletal, neuro, skin, endocrine and psych systems are negative, except as otherwise noted.    This document serves as a record of the services and decisions personally performed and made by Ramila Pak DO. It was created on her behalf by Belem Martin, a trained medical scribe. The creation of this document is based on the provider's statements to the medical scribe.  Belem Martin 8:56 AM 2020    OBJECTIVE:  /82   Ht 1.651 m (5' 5\")   Wt 103.1 kg (227 lb 4.8 oz)   LMP 2020 (Exact Date)   BMI 37.82 kg/m    General appearance: healthy, alert and no distress  Lungs: negative, Percussion normal. Good diaphragmatic excursion. Lungs clear  Heart: negative, PMI normal. No lifts, heaves, or thrills. RRR. No murmurs, clicks gallops or rub  Abdomen: Abdomen soft, non-tender. BS normal. No masses, organomegaly    ASSESSMENT:  Tiffanie Tate is an 34 year old  woman who presents for Gyn follow-up exam.     PLAN:  Dx: Endometriosis   1)  Pt will start the vaginal progesterone, she misplaced it and did not start it.   2)  Rx Mobic for pain management. She will continue to take the muscle relaxer as needed and schedule physical therapy   3) Return: in three months for recheck     Rx: - meloxicam (MOBIC) 15 MG tablet; Take 1 tablet (15 mg) by mouth daily      The information in this document, created by " the medical scribe for me, accurately reflects the services I personally performed and the decisions made by me. I have reviewed and approved this document for accuracy prior to leaving the patient care area.  January 23, 2020 9:03 AM    Dr. Ramila Pka,     OB/GYN   [facility]

## 2020-01-23 NOTE — NURSING NOTE
"Chief Complaint   Patient presents with     Repeat Pap Smear       Initial /82   Ht 1.651 m (5' 5\")   Wt 103.1 kg (227 lb 4.8 oz)   LMP 2020 (Exact Date)   BMI 37.82 kg/m   Estimated body mass index is 37.82 kg/m  as calculated from the following:    Height as of this encounter: 1.651 m (5' 5\").    Weight as of this encounter: 103.1 kg (227 lb 4.8 oz).  BP completed using cuff size: large    Questioned patient about current smoking habits.  Pt. has never smoked.          The following HM Due: pap smear      "

## 2020-01-23 NOTE — PATIENT INSTRUCTIONS
Return in 3 months     Dr. Ramila Pak, DO    Obstetrics and Gynecology  [facility] - [place] and [place]

## 2020-10-08 ENCOUNTER — PATIENT OUTREACH (OUTPATIENT)
Dept: OBGYN | Facility: CLINIC | Age: 35
End: 2020-10-08

## 2020-10-08 DIAGNOSIS — N87.1 CIN II (CERVICAL INTRAEPITHELIAL NEOPLASIA II): ICD-10-CM

## 2020-10-08 DIAGNOSIS — R87.810 CERVICAL HIGH RISK HPV (HUMAN PAPILLOMAVIRUS) TEST POSITIVE: ICD-10-CM

## 2020-11-12 DIAGNOSIS — R10.2 PELVIC PAIN IN FEMALE: ICD-10-CM

## 2020-11-12 RX ORDER — CYCLOBENZAPRINE HCL 5 MG
5 TABLET ORAL 3 TIMES DAILY PRN
Qty: 42 TABLET | Refills: 3 | Status: SHIPPED | OUTPATIENT
Start: 2020-11-12 | End: 2023-08-24

## 2020-11-12 NOTE — TELEPHONE ENCOUNTER
Routing refill request to provider for review/approval because:  Drug not on the [facility] refill protocol     Ximena Nieves RN

## 2020-11-12 NOTE — TELEPHONE ENCOUNTER
cyclobenzaprine      Last Written Prescription Date:  11/11/19  Last Fill Quantity: 42,   # refills: 3  Last Office Visit: 1/23/20  Future Office visit:

## 2021-01-15 ENCOUNTER — HEALTH MAINTENANCE LETTER (OUTPATIENT)
Age: 36
End: 2021-01-15

## 2021-09-04 ENCOUNTER — HEALTH MAINTENANCE LETTER (OUTPATIENT)
Age: 36
End: 2021-09-04

## 2022-02-19 ENCOUNTER — HEALTH MAINTENANCE LETTER (OUTPATIENT)
Age: 37
End: 2022-02-19

## 2022-02-23 ENCOUNTER — E-VISIT (OUTPATIENT)
Dept: URGENT CARE | Facility: CLINIC | Age: 37
End: 2022-02-23
Payer: COMMERCIAL

## 2022-02-23 DIAGNOSIS — J01.90 ACUTE SINUSITIS, RECURRENCE NOT SPECIFIED, UNSPECIFIED LOCATION: Primary | ICD-10-CM

## 2022-02-23 PROCEDURE — 99421 OL DIG E/M SVC 5-10 MIN: CPT | Performed by: PHYSICIAN ASSISTANT

## 2022-02-23 NOTE — PATIENT INSTRUCTIONS
Dear Tiffanie Tate    After reviewing your responses, I've been able to diagnose you with?a sinus infection.?     Based on your responses and diagnosis, I have prescribed Augmentin to treat your symptoms. I have sent this to your pharmacy.?     It is also important to stay well hydrated, get lots of rest and take over-the-counter decongestants,?tylenol?or ibuprofen if you?are able to?take those medications per your primary care provider to help relieve discomfort.?     It is important that you take?all of?your prescribed medication even if your symptoms are improving after a few doses.? Taking?all of?your medicine helps prevent the symptoms from returning.?     If your symptoms worsen, you develop severe headache, vomiting, high fever (>102), or are not improving in 7 days, please contact your primary care provider for an appointment or visit any of our convenient Walk-in Care or Urgent Care Centers to be seen which can be found on our website?here.?     Thanks again for choosing?us?as your health care partner,?   ?  Lisandra Stephen PA-C?

## 2022-10-22 ENCOUNTER — HEALTH MAINTENANCE LETTER (OUTPATIENT)
Age: 37
End: 2022-10-22

## 2023-03-22 NOTE — ADDENDUM NOTE
Addended by: ALESSANDRA SUAZO on: 10/22/2019 02:52 PM     Modules accepted: Orders    
Addended by: TY MACEDO on: 10/22/2019 12:07 PM     Modules accepted: Orders    
[Other: ____] : [unfilled]

## 2023-04-01 ENCOUNTER — HEALTH MAINTENANCE LETTER (OUTPATIENT)
Age: 38
End: 2023-04-01

## 2023-05-17 NOTE — PATIENT INSTRUCTIONS
Return in 6 months for pap and endometriosis check     Start Oral Contraceptive     Consider muscle relaxers and nerve medications if pain not better     Dr. Ramila Pak, DO    Obstetrics and Gynecology  [facility] - [place] and [place]          Detail Level: Generalized Detail Level: Zone Detail Level: Simple Detail Level: Detailed

## 2023-08-24 ENCOUNTER — OFFICE VISIT (OUTPATIENT)
Dept: OBGYN | Facility: CLINIC | Age: 38
End: 2023-08-24
Payer: COMMERCIAL

## 2023-08-24 VITALS
DIASTOLIC BLOOD PRESSURE: 86 MMHG | WEIGHT: 236 LBS | SYSTOLIC BLOOD PRESSURE: 128 MMHG | HEIGHT: 66 IN | BODY MASS INDEX: 37.93 KG/M2

## 2023-08-24 DIAGNOSIS — Z01.419 ENCOUNTER FOR GYNECOLOGICAL EXAMINATION (GENERAL) (ROUTINE) WITHOUT ABNORMAL FINDINGS: Primary | ICD-10-CM

## 2023-08-24 DIAGNOSIS — R10.2 PELVIC PAIN IN FEMALE: ICD-10-CM

## 2023-08-24 DIAGNOSIS — N80.9 ENDOMETRIOSIS: ICD-10-CM

## 2023-08-24 PROCEDURE — G0145 SCR C/V CYTO,THINLAYER,RESCR: HCPCS | Performed by: FAMILY MEDICINE

## 2023-08-24 PROCEDURE — 99385 PREV VISIT NEW AGE 18-39: CPT | Performed by: FAMILY MEDICINE

## 2023-08-24 PROCEDURE — 87624 HPV HI-RISK TYP POOLED RSLT: CPT | Performed by: FAMILY MEDICINE

## 2023-08-24 RX ORDER — MULTIVITAMIN WITH IRON
1 TABLET ORAL DAILY
COMMUNITY

## 2023-08-24 RX ORDER — CYCLOBENZAPRINE HCL 5 MG
5 TABLET ORAL 3 TIMES DAILY PRN
Qty: 42 TABLET | Refills: 3 | Status: SHIPPED | OUTPATIENT
Start: 2023-08-24

## 2023-08-24 RX ORDER — CYCLOBENZAPRINE HCL 5 MG
5 TABLET ORAL 3 TIMES DAILY PRN
Qty: 42 TABLET | Refills: 3 | Status: SHIPPED | OUTPATIENT
Start: 2023-08-24 | End: 2023-08-24

## 2023-08-24 RX ORDER — MULTIVIT-MIN/IRON/FOLIC ACID/K 18-600-40
CAPSULE ORAL
COMMUNITY

## 2023-08-24 RX ORDER — OMEGA-3/DHA/EPA/FISH OIL 60 MG-90MG
CAPSULE ORAL
COMMUNITY

## 2023-08-24 RX ORDER — CALCIUM/MAGNESIUM/ZINC 333-133 MG
TABLET ORAL
COMMUNITY

## 2023-08-24 RX ORDER — ERGOCALCIFEROL (VITAMIN D2) 10 MCG
TABLET ORAL
COMMUNITY

## 2023-08-24 RX ORDER — MELOXICAM 15 MG/1
15 TABLET ORAL DAILY
Qty: 30 TABLET | Refills: 11 | Status: SHIPPED | OUTPATIENT
Start: 2023-08-24

## 2023-08-24 NOTE — PATIENT INSTRUCTIONS
[facility]   [place] [phone]    For fasting labs (for cholesterol):  please Call Lab [phone] [place] or [phone] [place] to schedule labs on a future day   [facility]    Or if you prefer you can get non-fasting cholesterol and all the labs today    To schedule mammogram they will call you, or you can call [phone] to schedule it!    Dr. Ramila Pak, DO    Obstetrics and Gynecology  [facility] - [place] and [place]

## 2023-08-24 NOTE — PROGRESS NOTES
SUBJECTIVE:  Tiffanie Tate is an 37 year old  woman who presents for   annual gyn exam. Patient's last menstrual period was 2023. Periods are regular q 28-30 days, lasting   4 days. Dysmenorrhea:moderate , occurring premenstrually. Cyclic symptoms   include none. No intermenstrual bleeding,   spotting, or discharge.  Menarche age teenager.  STD hx: none.    Current contraception: none, lesbian  MARIA DEL ROSARIO exposure: no  History of abnormal Pap smear: Yes:      10/4/17 NIL, +HPV 16. Plan colp  2/15/18 [place] bx: DESMOND 1, ECC: DESMOND 1 & DESMOND 2. Plan LEEP  18 LEEP bx: DESMOND 2. Plan 6 month pap  18 ASCUS pap, neg HR HPV. Plan 6 month cotest        Family history of uterine or ovarian cancer: No  Regular self breast exam: No  History of abnormal mammogram: No  Family history of breast cancer: Yes: Maternal Aunt  History of abnormal lipids: No     Hx of endometriosis. Status post endometriosis resection in 2018.  Pain is better with mobic, accupuncture and muscle relaxer.     Past Medical History:   Diagnosis Date    Abnormal Pap smear of cervix 2018    see problem list    Cervical high risk HPV (human papillomavirus) test positive 10/04/2017    see Problem List    H/O colposcopy with cervical biopsy 02/15/2018    see problem list    History of asthma 1991    resolved after PE tubes and sinus surgery, allergy treatment        Family History   Problem Relation Age of Onset    Lung Cancer Maternal Grandfather     Alzheimer Disease Paternal Grandmother     Heart Disease Paternal Grandfather        Past Surgical History:   Procedure Laterality Date    CONIZATION LEEP N/A 2018    Procedure: CONIZATION LEEP;;  Surgeon: Ramila Pka DO;  Location: [facility] OR    DAVINCI PELVIC PROCEDURE N/A 2018    Procedure: DAVINCI PELVIC PROCEDURE;  Robotic Assisted Endometriosis Resection and fulgeration, Loop Electrosurgical Excision Procedure with Chromotubation;  Surgeon: Ramila Pak DO;   "Location: [facility] OR    LEEP TX, CERVICAL  2007, 2018    PE TUBES  1990    and sinus procedure       Current Outpatient Medications   Medication    Ascorbic Acid (VITAMIN C) 500 MG CAPS    cyclobenzaprine (FLEXERIL) 5 MG tablet    fish oil-omega-3 fatty acids 500 MG capsule    magnesium 250 MG tablet    meloxicam (MOBIC) 15 MG tablet    Milk Thistle-Dand-Fennel-Licor (MILK THISTLE XTRA) CAPS capsule    vitamin B-Complex    Vitamin D, Cholecalciferol, 10 MCG (400 UNIT) TABS     No current facility-administered medications for this visit.     Allergies   Allergen Reactions    Dust Mites     Sulfa Antibiotics Nausea and Vomiting       Social History     Tobacco Use    Smoking status: Never    Smokeless tobacco: Never   Substance Use Topics    Alcohol use: Yes     Alcohol/week: 2.0 standard drinks of alcohol     Types: 2 Standard drinks or equivalent per week     Comment: occasional       Review Of Systems  Ears/Nose/Throat: negative  Respiratory: No shortness of breath, dyspnea on exertion, cough, or hemoptysis  Cardiovascular: negative  Gastrointestinal: negative  Genitourinary: See HPI   Constitutional, HEENT, cardiovascular, pulmonary, GI, , musculoskeletal, neuro, skin, endocrine and psych systems are negative, except as otherwise noted.      OBJECTIVE:  /86   Ht 1.664 m (5' 5.5\")   Wt 107 kg (236 lb)   LMP 08/07/2023   BMI 38.68 kg/m      General appearance: healthy, alert and no distress  Skin: Skin color, texture, turgor normal. No rashes or lesions.  Ears: negative  Nose/Sinuses: Nares normal. Septum midline. Mucosa normal. No drainage or sinus tenderness.  Oropharynx: Lips, mucosa, and tongue normal. Teeth and gums normal.  Neck: Neck supple. No adenopathy. Thyroid symmetric, normal size,, Carotids without bruits.  Lungs: negative, Percussion normal. Good diaphragmatic excursion. Lungs clear  Heart: negative, PMI normal. No lifts, heaves, or thrills. RRR. No murmurs, clicks gallops or rub  Breasts: " Inspection negative. No nipple discharge or bleeding. No masses.  Abdomen: Abdomen soft, non-tender. BS normal. No masses, organomegaly  Pelvic: Pelvic:  Pelvic examination with  pap/no Gonorrhea and Chlamydia   including  External genitalia normal   and vagina normal rugatted not atrophic  Examination of urethra  normal no masses, tenderness, scarring  bladder, no masses or tenderness  Cervix  no lesions or discharge  Bimanual exam with   Uterus 6 weeks size, mid position, mobile,no-tenderness, normal no descent   Adnexa/parametria  normal no masses       ASSESSMENT:  Tiffanie Tate is an 37 year old  woman who presents for   annual gyn exam.   PLAN:  Dx:  1)  Pap smear completed  Gonorrhea  Chlamydia declines  2)  Mammography , lipids at appropriate intervals ordered    Colonoscopy not due   3)  Covid-19 concerns: covid infection and vaccination recommendations discussed.   4)  Contraception: none needed   5)  Endometriosis: s/p endometriosis resection last resected in  and ,  Controlled with acupuncture, flexeril, and mobic  Plans to see Dr. Membreno for endometriosis resection.       PE:  Reviewed health maintenance including diet, regular exercise   and periodic exams.    Dr. Ramila Pak, DO    Obstetrics and Gynecology  [facility] - [place] and [place]

## 2023-08-24 NOTE — NURSING NOTE
"Chief Complaint   Patient presents with    Physical       Initial /86   Ht 1.664 m (5' 5.5\")   Wt 107 kg (236 lb)   LMP 2023   BMI 38.68 kg/m   Estimated body mass index is 38.68 kg/m  as calculated from the following:    Height as of this encounter: 1.664 m (5' 5.5\").    Weight as of this encounter: 107 kg (236 lb).  BP completed using cuff size: large    Questioned patient about current smoking habits.  Pt. has never smoked.          The following HM Due: pap smear    Needs refill on Flexeril  Recommendation for GP  Would like to go forward with \"Excision of endometriosis\"  The following patient reported/Care Every where data was sent to:    Abbie Neves CMA on 2023 at 3:34 PM    "

## 2023-08-29 LAB
BKR LAB AP GYN ADEQUACY: NORMAL
BKR LAB AP GYN INTERPRETATION: NORMAL
BKR LAB AP HPV REFLEX: NORMAL
BKR LAB AP LMP: NORMAL
BKR LAB AP PREVIOUS ABNL DX: NORMAL
BKR LAB AP PREVIOUS ABNORMAL: NORMAL
PATH REPORT.COMMENTS IMP SPEC: NORMAL
PATH REPORT.COMMENTS IMP SPEC: NORMAL
PATH REPORT.RELEVANT HX SPEC: NORMAL

## 2023-08-31 ENCOUNTER — PATIENT OUTREACH (OUTPATIENT)
Dept: OBGYN | Facility: CLINIC | Age: 38
End: 2023-08-31
Payer: COMMERCIAL

## 2023-08-31 DIAGNOSIS — N87.1 CIN II (CERVICAL INTRAEPITHELIAL NEOPLASIA II): Primary | ICD-10-CM

## 2023-08-31 LAB
HUMAN PAPILLOMA VIRUS 16 DNA: NEGATIVE
HUMAN PAPILLOMA VIRUS 18 DNA: NEGATIVE
HUMAN PAPILLOMA VIRUS FINAL DIAGNOSIS: NORMAL
HUMAN PAPILLOMA VIRUS OTHER HR: NEGATIVE

## 2024-05-10 ENCOUNTER — LAB (OUTPATIENT)
Dept: LAB | Facility: CLINIC | Age: 39
End: 2024-05-10
Payer: COMMERCIAL

## 2024-05-10 DIAGNOSIS — Z11.3 SCREENING EXAMINATION FOR VENEREAL DISEASE: Primary | ICD-10-CM

## 2024-05-10 LAB
HBV CORE AB SERPL QL IA: NONREACTIVE
HBV SURFACE AG SERPL QL IA: NONREACTIVE
HCV AB SERPL QL IA: NONREACTIVE
T PALLIDUM AB SER QL: NONREACTIVE

## 2024-05-10 PROCEDURE — 87389 HIV-1 AG W/HIV-1&-2 AB AG IA: CPT

## 2024-05-10 PROCEDURE — 86780 TREPONEMA PALLIDUM: CPT

## 2024-05-10 PROCEDURE — 36415 COLL VENOUS BLD VENIPUNCTURE: CPT

## 2024-05-10 PROCEDURE — 87340 HEPATITIS B SURFACE AG IA: CPT

## 2024-05-10 PROCEDURE — 86803 HEPATITIS C AB TEST: CPT

## 2024-05-10 PROCEDURE — 86704 HEP B CORE ANTIBODY TOTAL: CPT

## 2024-05-11 LAB — HIV 1+2 AB+HIV1 P24 AG SERPL QL IA: NONREACTIVE

## 2024-08-05 ENCOUNTER — PATIENT OUTREACH (OUTPATIENT)
Dept: OBGYN | Facility: CLINIC | Age: 39
End: 2024-08-05

## 2024-08-27 ENCOUNTER — OFFICE VISIT (OUTPATIENT)
Dept: URGENT CARE | Facility: URGENT CARE | Age: 39
End: 2024-08-27

## 2024-08-27 VITALS
SYSTOLIC BLOOD PRESSURE: 118 MMHG | TEMPERATURE: 98.4 F | BODY MASS INDEX: 38.71 KG/M2 | DIASTOLIC BLOOD PRESSURE: 84 MMHG | OXYGEN SATURATION: 97 % | WEIGHT: 236.2 LBS | HEART RATE: 83 BPM

## 2024-08-27 DIAGNOSIS — J01.90 ACUTE SINUSITIS WITH COEXISTING CONDITION REQUIRING PROPHYLACTIC TREATMENT: Primary | ICD-10-CM

## 2024-08-27 PROCEDURE — 99203 OFFICE O/P NEW LOW 30 MIN: CPT | Performed by: FAMILY MEDICINE

## 2024-08-27 RX ORDER — DOXYCYCLINE 100 MG/1
100 TABLET ORAL 2 TIMES DAILY
Qty: 14 TABLET | Refills: 0 | Status: SHIPPED | OUTPATIENT
Start: 2024-08-27 | End: 2024-09-03

## 2024-08-27 NOTE — PROGRESS NOTES
SUBJECTIVE: Tiffanie Tate is a 39 year old female here with concerns about sinus infection.  She states onset  of symptoms were greater than 10 days with sinus pressure and drainage.  Course of illness is worsening.    Severity: moderate  Current and Associated symptoms: cold symptoms  Predisposing factors include none.   Recent treatment has included: OTC's  Allergic symptoms include: yes    Past Medical History:   Diagnosis Date    Abnormal Pap smear of cervix 09/06/2018    see problem list    Cervical high risk HPV (human papillomavirus) test positive 10/04/2017    see Problem List    H/O colposcopy with cervical biopsy 02/15/2018    see problem list    History of asthma 1991    resolved after PE tubes and sinus surgery, allergy treatment     Allergies   Allergen Reactions    Dust Mites     Sulfa Antibiotics Nausea and Vomiting     Social History     Tobacco Use    Smoking status: Never    Smokeless tobacco: Never   Substance Use Topics    Alcohol use: Yes     Alcohol/week: 2.0 standard drinks of alcohol     Types: 2 Standard drinks or equivalent per week     Comment: occasional       ROS:   no rash  no vomiting    OBJECTIVE:  /84   Pulse 83   Temp 98.4  F (36.9  C) (Tympanic)   Wt 107.1 kg (236 lb 3.2 oz)   SpO2 97%   BMI 38.71 kg/m    NAD  EYES: clear, no mattering  EARS: TM's clear, no redness/buldging, canals clear, no swelling/redness  NOSE: discolored discharge will. Nares  THROAT: clear, no erythema, no exudate  SINUS: maxillary tenderness with palpation  LUNGS: CTAB, no rhonchi/wheeze/rales      ICD-10-CM    1. Acute sinusitis with coexisting condition requiring prophylactic treatment  J01.90 doxycycline monohydrate (ADOXA) 100 MG tablet          Follow up with primary clinic if not improving

## 2024-09-30 NOTE — TELEPHONE ENCOUNTER
FYI to provider - Patient is lost to pap tracking follow-up. Attempts to contact pt have been made per reminder process and there has been no reply and/or no appt scheduled. Contact hx listed below.     10/4/17 NIL, +HPV 16. Plan colp  2/15/18 [place] bx: DESMOND 1, ECC: DESMOND 1 & DESMOND 2. Plan LEEP  2/21/18 LEEP bx: DESMOND 2. Plan 6 month pap  9/6/18 ASCUS pap, neg HR HPV. Plan 6 month cotest  4/4/19 Lost to follow-up for pap tracking  10/22/19 NIL pap, neg HR HPV. Plan : cotest in 1 year  11/10/20 Patient is lost to pap tracking follow-up. FYI routed to provider.  8/24/23 NIL pap, Neg HPV. Plan cotest in 1 year due by 8/24/24 8/5/24 Reminder mychart  9/5/24 Reminder call- lm  9/30/24 Lost to follow up

## 2024-10-20 ENCOUNTER — HEALTH MAINTENANCE LETTER (OUTPATIENT)
Age: 39
End: 2024-10-20

## 2025-08-03 ENCOUNTER — MYC REFILL (OUTPATIENT)
Dept: OBGYN | Facility: CLINIC | Age: 40
End: 2025-08-03

## 2025-08-03 DIAGNOSIS — R10.2 PELVIC PAIN IN FEMALE: ICD-10-CM

## 2025-08-04 RX ORDER — CYCLOBENZAPRINE HCL 5 MG
5 TABLET ORAL 3 TIMES DAILY PRN
Qty: 42 TABLET | Refills: 0 | OUTPATIENT
Start: 2025-08-04

## 2025-08-04 RX ORDER — CYCLOBENZAPRINE HCL 5 MG
5 TABLET ORAL 3 TIMES DAILY PRN
Qty: 42 TABLET | Refills: 3 | OUTPATIENT
Start: 2025-08-04

## (undated) DEVICE — TUBING IV EXTENSION SET 34"

## (undated) DEVICE — TUBING CONMED AIRSEAL SMOKE EVAC INSUFFLATION ASM-EVAC

## (undated) DEVICE — CATH TRAY FOLEY SURESTEP 16FR DRAIN BAG STATOCK A899916

## (undated) DEVICE — FILTER CAP SMOKE EVAC E3625

## (undated) DEVICE — PACK MINOR CUSTOM RIDGES SBA32RMRMA

## (undated) DEVICE — DAVINCI HOT SHEARS TIP COVER  400180

## (undated) DEVICE — SU MONOCRYL 4-0 PS-2 18" UND Y496G

## (undated) DEVICE — SOL NACL 0.9% IRRIG 1000ML BOTTLE 2F7124

## (undated) DEVICE — GLOVE PROTEXIS POWDER FREE SMT 6.0  2D72PT60X

## (undated) DEVICE — LINEN HALF SHEET 5512

## (undated) DEVICE — ESU ELEC LEEP BALL 5MM

## (undated) DEVICE — SYR 50ML CATH TIP W/O NDL 309620

## (undated) DEVICE — TUBING SMOKE EVAC 3/8"X10' E3645

## (undated) DEVICE — RETR ELEV / UTERINE MANIPULATOR V-CARE LG CUP 60-6085-202A

## (undated) DEVICE — SUCTION IRR STRYKERFLOW II W/TIP 250-070-520

## (undated) DEVICE — SOL DEXTROSE 5% 250ML BAG 2B0062Q

## (undated) DEVICE — GLOVE PROTEXIS BLUE W/NEU-THERA 6.5  2D73EB65

## (undated) DEVICE — SOL NACL 0.9% INJ 1000ML BAG 2B1324X

## (undated) DEVICE — NDL BLUNT 18GA 1.5" FILTER 305211

## (undated) DEVICE — NDL INSUFFLATION 14GA STEP S100000

## (undated) DEVICE — LINEN TOWEL PACK X10 5473

## (undated) DEVICE — PACK DAVINCI GYN SMA15GDFS1

## (undated) DEVICE — DRSG TELFA 3X8" 1238

## (undated) DEVICE — DAVINCI S FCP BIPOLAR FENESTRATED 420205

## (undated) DEVICE — DAVINCI S CANNULA SEAL 8.5-13MM 420206

## (undated) DEVICE — PROTECTOR ARM ONE-STEP TRENDELENBURG 40418

## (undated) DEVICE — SU DERMABOND ADVANCED .7ML DNX12

## (undated) DEVICE — SYR 50ML LL W/O NDL 309653

## (undated) DEVICE — DAVINCI SI DRAPE ACCESSORY KIT 3-ARM 420290

## (undated) DEVICE — SUCTION CURETTE 3MM ENDOMETRIAL MX140

## (undated) DEVICE — DAVINCI S MONOPOLAR SCISSORS PRECURVED HOT SHEARS 420179

## (undated) DEVICE — SYR 30ML LL W/O NDL 302832

## (undated) DEVICE — ENDO TROCAR CONMED AIRSEAL BLADELESS 05X120MM IAS5-120LP

## (undated) DEVICE — NDL SPINAL 22GA 3.5" QUINCKE 405181

## (undated) DEVICE — BAG CLEAR TRASH 1.3M 39X33" P4040C

## (undated) DEVICE — PAD CHUX UNDERPAD 30X36" P3036C

## (undated) DEVICE — ESU ELEC LEEP LOOP 15X12MM GREEN DLP-M11

## (undated) DEVICE — ESU GROUND PAD ADULT W/CORD E7507

## (undated) DEVICE — SUCTION MANIFOLD NEPTUNE 2 SYS 4 PORT 0702-020-000

## (undated) DEVICE — KIT PATIENT POSITIONING PIGAZZI LATEX FREE 40580

## (undated) DEVICE — ESU HOLDER LAP INST DISP PURPLE LONG 330MM H-PRO-330

## (undated) DEVICE — ESU CLEANER TIP 31142717

## (undated) DEVICE — ESU CORD MONOPOLAR 10'  E0510

## (undated) DEVICE — BLADE KNIFE SURG 11 371111

## (undated) DEVICE — SU VICRYL 0 UR-6 27" J603H

## (undated) DEVICE — DAVINCI OBTURATOR 8MM BLADELESS 420023

## (undated) DEVICE — LINEN FULL SHEET 5511

## (undated) DEVICE — TUBING SUCTION 12"X1/4" N612

## (undated) DEVICE — BARRIER SEPRAFILM 5X6" SINGLE SHEET 4301-02

## (undated) DEVICE — SWAB PROCTO 16" 2/PK 32-046

## (undated) DEVICE — SYR 03ML LL W/O NDL 309657

## (undated) DEVICE — LINEN TOWEL PACK X5 5464

## (undated) DEVICE — SYR 03ML LL W/O NDL

## (undated) DEVICE — SUCTION CANISTER MEDIVAC LINER 3000ML W/LID 65651-530

## (undated) DEVICE — GLOVE PROTEXIS MICRO 7.0  2D73PM70

## (undated) DEVICE — SOL WATER IRRIG 1000ML BOTTLE 07139-09

## (undated) RX ORDER — BUPIVACAINE HYDROCHLORIDE AND EPINEPHRINE 5; 5 MG/ML; UG/ML
INJECTION, SOLUTION EPIDURAL; INTRACAUDAL; PERINEURAL
Status: DISPENSED
Start: 2018-02-21

## (undated) RX ORDER — OXYCODONE HYDROCHLORIDE 5 MG/1
TABLET ORAL
Status: DISPENSED
Start: 2018-02-21

## (undated) RX ORDER — GLYCOPYRROLATE 0.2 MG/ML
INJECTION INTRAMUSCULAR; INTRAVENOUS
Status: DISPENSED
Start: 2018-02-21

## (undated) RX ORDER — PROPOFOL 10 MG/ML
INJECTION, EMULSION INTRAVENOUS
Status: DISPENSED
Start: 2018-02-21

## (undated) RX ORDER — NEOSTIGMINE METHYLSULFATE 1 MG/ML
VIAL (ML) INJECTION
Status: DISPENSED
Start: 2018-02-21

## (undated) RX ORDER — CITRIC ACID/SODIUM CITRATE 334-500MG
SOLUTION, ORAL ORAL
Status: DISPENSED
Start: 2018-02-21

## (undated) RX ORDER — FENTANYL CITRATE 50 UG/ML
INJECTION, SOLUTION INTRAMUSCULAR; INTRAVENOUS
Status: DISPENSED
Start: 2018-02-21

## (undated) RX ORDER — DEXAMETHASONE SODIUM PHOSPHATE 4 MG/ML
INJECTION, SOLUTION INTRA-ARTICULAR; INTRALESIONAL; INTRAMUSCULAR; INTRAVENOUS; SOFT TISSUE
Status: DISPENSED
Start: 2018-02-21

## (undated) RX ORDER — BUPIVACAINE HYDROCHLORIDE 2.5 MG/ML
INJECTION, SOLUTION EPIDURAL; INFILTRATION; INTRACAUDAL
Status: DISPENSED
Start: 2018-02-21

## (undated) RX ORDER — ONDANSETRON 2 MG/ML
INJECTION INTRAMUSCULAR; INTRAVENOUS
Status: DISPENSED
Start: 2018-02-21

## (undated) RX ORDER — ACETAMINOPHEN 325 MG/1
TABLET ORAL
Status: DISPENSED
Start: 2018-02-21

## (undated) RX ORDER — CEFAZOLIN SODIUM 2 G/100ML
INJECTION, SOLUTION INTRAVENOUS
Status: DISPENSED
Start: 2018-02-21

## (undated) RX ORDER — KETOROLAC TROMETHAMINE 30 MG/ML
INJECTION, SOLUTION INTRAMUSCULAR; INTRAVENOUS
Status: DISPENSED
Start: 2018-02-21